# Patient Record
Sex: FEMALE | Race: WHITE | NOT HISPANIC OR LATINO | Employment: FULL TIME
[De-identification: names, ages, dates, MRNs, and addresses within clinical notes are randomized per-mention and may not be internally consistent; named-entity substitution may affect disease eponyms.]

---

## 2017-05-26 ENCOUNTER — HEALTH MAINTENANCE LETTER (OUTPATIENT)
Age: 42
End: 2017-05-26

## 2018-03-26 ENCOUNTER — OFFICE VISIT (OUTPATIENT)
Dept: URGENT CARE | Facility: RETAIL CLINIC | Age: 43
End: 2018-03-26
Payer: COMMERCIAL

## 2018-03-26 VITALS
SYSTOLIC BLOOD PRESSURE: 118 MMHG | TEMPERATURE: 97.8 F | OXYGEN SATURATION: 99 % | DIASTOLIC BLOOD PRESSURE: 69 MMHG | HEART RATE: 80 BPM

## 2018-03-26 DIAGNOSIS — J02.0 STREPTOCOCCAL PHARYNGITIS: ICD-10-CM

## 2018-03-26 DIAGNOSIS — J02.9 ACUTE PHARYNGITIS, UNSPECIFIED ETIOLOGY: Primary | ICD-10-CM

## 2018-03-26 LAB — S PYO AG THROAT QL IA.RAPID: ABNORMAL

## 2018-03-26 PROCEDURE — 99213 OFFICE O/P EST LOW 20 MIN: CPT | Performed by: FAMILY MEDICINE

## 2018-03-26 PROCEDURE — 87880 STREP A ASSAY W/OPTIC: CPT | Mod: QW | Performed by: FAMILY MEDICINE

## 2018-03-26 RX ORDER — CLINDAMYCIN HCL 150 MG
150 CAPSULE ORAL 3 TIMES DAILY
Qty: 30 CAPSULE | Refills: 0 | Status: SHIPPED | OUTPATIENT
Start: 2018-03-26 | End: 2019-03-10

## 2018-03-26 NOTE — NURSING NOTE
"Chief Complaint   Patient presents with     Pharyngitis     x 3 days       Initial /69  Pulse 80  Temp 97.8  F (36.6  C) (Oral)  SpO2 99% Estimated body mass index is 25.9 kg/(m^2) as calculated from the following:    Height as of 5/31/15: 5' 0.98\" (1.549 m).    Weight as of 3/30/16: 137 lb (62.1 kg).  Medication Reconciliation: complete   Dana Nguyen      "

## 2018-03-26 NOTE — PROGRESS NOTES
SUBJECTIVE:  Mecca Engle is a 43 year old female with a chief complaint of sore throat.  Onset of symptoms was 2 day(s) ago.    Course of illness: still present and worsening.  Severity moderate  Current and Associated symptoms: fatigue  Treatment measures tried include Tylenol/Ibuprofen.  Predisposing factors include exposure to strep.    Past Medical History:   Diagnosis Date     Abnormal Papanicolaou smear of cervix and cervical HPV 1993    s/p cryotherapy     Supervision of high-risk pregnancy with grand multiparity      Current Outpatient Prescriptions   Medication Sig Dispense Refill     clindamycin (CLEOCIN) 150 MG capsule Take 1 capsule (150 mg) by mouth 3 times daily 30 capsule 0     ibuprofen (ADVIL,MOTRIN) 800 MG tablet Take 1 tablet (800 mg) by mouth every 8 hours as needed for moderate pain 60 tablet 1     History   Smoking Status     Never Smoker   Smokeless Tobacco     Never Used       ROS:  Review of systems negative except as stated above.    OBJECTIVE:   /69  Pulse 80  Temp 97.8  F (36.6  C) (Oral)  SpO2 99%  GENERAL APPEARANCE: healthy, alert and no distress  EYES: EOMI,  PERRL, conjunctiva clear  HENT: pharynx red  NECK: bilateral anterior cervical adenopathy  RESP: lungs clear to auscultation - no rales, rhonchi or wheezes  CV: regular rates and rhythm, normal S1 S2, no murmur noted  ABDOMEN:  soft, nontender, no HSM or masses and bowel sounds normal  SKIN: no suspicious lesions or rashes    Rapid Strep test is positive    ASSESSMENT:clindamycin     Acute pharyngitis, unspecified etiology  Streptococcal pharyngitis    PLAN:   Symptomatic treat with gargles, lozenges, and OTC analgesic as needed.   Follow-up with primary care provider if not improving.

## 2018-03-26 NOTE — PATIENT INSTRUCTIONS
Pharyngitis: Strep (Confirmed)    You have had a positive test for strep throat. Strep throat is a contagious illness. It is spread by coughing, kissing or by touching others after touching your mouth or nose. Symptoms include throat pain that is worse with swallowing, aching all over, headache, and fever. It is treated with antibiotic medicine. This should help you start to feel better in 1 to 2 days.  Home care    Rest at home. Drink plenty of fluids to you won't get dehydrated.    No work or school for the first 2 days of taking the antibiotics. After this time, you will not be contagious. You can then return to school or work if you are feeling better.     Take antibiotic medicine for the full 10 days, even if you feel better. This is very important to ensure the infection is treated. It is also important to prevent medicine-resistant germs from developing. If you were given an antibiotic shot, you don't need any more antibiotics.    You may use acetaminophen or ibuprofen to control pain or fever, unless another medicine was prescribed for this. Talk with your doctor before taking these medicines if you have chronic liver or kidney disease. Also talk with your doctor if you have had a stomach ulcer or GI bleeding.    Throat lozenges or sprays help reduce pain. Gargling with warm saltwater will also reduce throat pain. Dissolve 1/2 teaspoon of salt in 1 glass of warm water. This may be useful just before meals.     Soft foods are OK. Avoid salty or spicy foods.  Follow-up care  Follow up with your healthcare provider or our staff if you don't get better over the next week.  When to seek medical advice  Call your healthcare provider right away if any of these occur:    Fever of 100.4 F (38 C) or higher, or as directed by your healthcare provider    New or worsening ear pain, sinus pain, or headache    Painful lumps in the back of neck    Stiff neck    Lymph nodes getting larger or becoming soft in the  middle    You can't swallow liquids or you can't open your mouth wide because of throat pain    Signs of dehydration. These include very dark urine or no urine, sunken eyes, and dizziness.    Trouble breathing or noisy breathing    Muffled voice    Rash  Date Last Reviewed: 4/13/2015 2000-2017 [employer]. [address] [place] [address], [place] [address]. All rights reserved. This information is not intended as a substitute for professional medical care. Always follow your healthcare professional's instructions.

## 2018-03-26 NOTE — MR AVS SNAPSHOT
After Visit Summary   3/26/2018    Mecca Engle    MRN: [id number]           Patient Information     Date Of Birth          1975        Visit Information        Provider Department      3/26/2018 10:00 AM Bonifacio Glasgow MD [facility]        Today's Diagnoses     Acute pharyngitis, unspecified etiology    -  1    Streptococcal pharyngitis          Care Instructions      Pharyngitis: Strep (Confirmed)    You have had a positive test for strep throat. Strep throat is a contagious illness. It is spread by coughing, kissing or by touching others after touching your mouth or nose. Symptoms include throat pain that is worse with swallowing, aching all over, headache, and fever. It is treated with antibiotic medicine. This should help you start to feel better in 1 to 2 days.  Home care    Rest at home. Drink plenty of fluids to you won't get dehydrated.    No work or school for the first 2 days of taking the antibiotics. After this time, you will not be contagious. You can then return to school or work if you are feeling better.     Take antibiotic medicine for the full 10 days, even if you feel better. This is very important to ensure the infection is treated. It is also important to prevent medicine-resistant germs from developing. If you were given an antibiotic shot, you don't need any more antibiotics.    You may use acetaminophen or ibuprofen to control pain or fever, unless another medicine was prescribed for this. Talk with your doctor before taking these medicines if you have chronic liver or kidney disease. Also talk with your doctor if you have had a stomach ulcer or GI bleeding.    Throat lozenges or sprays help reduce pain. Gargling with warm saltwater will also reduce throat pain. Dissolve 1/2 teaspoon of salt in 1 glass of warm water. This may be useful just before meals.     Soft foods are OK. Avoid salty or spicy foods.  Follow-up care  Follow up with your  "healthcare provider or our staff if you don't get better over the next week.  When to seek medical advice  Call your healthcare provider right away if any of these occur:    Fever of 100.4 F (38 C) or higher, or as directed by your healthcare provider    New or worsening ear pain, sinus pain, or headache    Painful lumps in the back of neck    Stiff neck    Lymph nodes getting larger or becoming soft in the middle    You can't swallow liquids or you can't open your mouth wide because of throat pain    Signs of dehydration. These include very dark urine or no urine, sunken eyes, and dizziness.    Trouble breathing or noisy breathing    Muffled voice    Rash  Date Last Reviewed: 2015-2017 [employer]. [address] [place] [address], [place] [address]. All rights reserved. This information is not intended as a substitute for professional medical care. Always follow your healthcare professional's instructions.                Follow-ups after your visit        Who to contact     You can reach your care team any time of the day by calling [phone].  Notification of test results:  If you have an abnormal lab result, we will notify you by phone as soon as possible.         Additional Information About Your Visit        [employer] Information     [employer] lets you send messages to your doctor, view your test results, renew your prescriptions, schedule appointments and more. To sign up, go to [email or web address] . Click on \"Log in\" on the left side of the screen, which will take you to the Welcome page. Then click on \"Sign up Now\" on the right side of the page.     You will be asked to enter the access code listed below, as well as some personal information. Please follow the directions to create your username and password.     Your access code is: [email or web address]  Expires: 2018 10:13 AM     Your access code will  in 90 days. If you need help or a new code, please call your [facility] clinic or " [phone].        Care EveryWhere ID     This is your Care EveryWhere ID. This could be used by other organizations to access your [place] medical records  [id number]        Your Vitals Were     Pulse Temperature Pulse Oximetry             80 97.8  F (36.6  C) (Oral) 99%          Blood Pressure from Last 3 Encounters:   03/26/18 118/69   03/30/16 108/74   05/31/15 (!) 115/96    Weight from Last 3 Encounters:   03/30/16 137 lb (62.1 kg)   09/01/09 156 lb (70.8 kg)   08/18/09 157 lb (71.2 kg)              We Performed the Following     BETA STREP GROUP A R/O CULTURE     RAPID STREP SCREEN          Today's Medication Changes          These changes are accurate as of 3/26/18 10:14 AM.  If you have any questions, ask your nurse or doctor.               Start taking these medicines.        Dose/Directions    clindamycin 150 MG capsule   Commonly known as:  CLEOCIN   Used for:  Streptococcal pharyngitis   Started by:  Bonifacio Glasgow MD        Dose:  150 mg   Take 1 capsule (150 mg) by mouth 3 times daily   Quantity:  30 capsule   Refills:  0            Where to get your medicines      These medications were sent to [address]  [address], [facility] [address]     Phone:  [phone]     clindamycin 150 MG capsule                Primary Care Provider Office Phone # Fax #    Cooper MADDISON Moraes [phone]        [facility]   [facility] [id number]        Equal Access to Services     [facility]: Hadii jas ku hadasho Soomaali, waaxda luqadaha, qaybta kaalmada adeegyada, montrell morocho. So [facility] [phone].    ATENCIÓN: Si habla español, tiene a boss disposición servicios gratuitos de asistencia lingüística. Llame al [phone].    We comply with applicable federal civil rights laws and [place] laws. We do not discriminate on the basis of race, color, national origin, age, disability, sex, sexual orientation, or gender  identity.            Thank you!     Thank you for choosing [facility]  for your care. Our goal is always to provide you with excellent care. Hearing back from our patients is one way we can continue to improve our services. Please take a few minutes to complete the written survey that you may receive in the mail after your visit with us. Thank you!             Your Updated Medication List - Protect others around you: Learn how to safely use, store and throw away your medicines at www.disposemymeds.org.          This list is accurate as of 3/26/18 10:14 AM.  Always use your most recent med list.                   Brand Name Dispense Instructions for use Diagnosis    clindamycin 150 MG capsule    CLEOCIN    30 capsule    Take 1 capsule (150 mg) by mouth 3 times daily    Streptococcal pharyngitis       ibuprofen 800 MG tablet    ADVIL/MOTRIN    60 tablet    Take 1 tablet (800 mg) by mouth every 8 hours as needed for moderate pain    S/P D&C (status post dilation and curettage), Miscarriage

## 2019-03-10 ENCOUNTER — OFFICE VISIT (OUTPATIENT)
Dept: URGENT CARE | Facility: RETAIL CLINIC | Age: 44
End: 2019-03-10
Payer: COMMERCIAL

## 2019-03-10 VITALS
DIASTOLIC BLOOD PRESSURE: 76 MMHG | SYSTOLIC BLOOD PRESSURE: 116 MMHG | HEART RATE: 77 BPM | TEMPERATURE: 98.5 F | OXYGEN SATURATION: 100 %

## 2019-03-10 DIAGNOSIS — J02.9 ACUTE PHARYNGITIS, UNSPECIFIED ETIOLOGY: Primary | ICD-10-CM

## 2019-03-10 DIAGNOSIS — J02.0 STREPTOCOCCAL PHARYNGITIS: ICD-10-CM

## 2019-03-10 LAB — S PYO AG THROAT QL IA.RAPID: NORMAL

## 2019-03-10 PROCEDURE — 87081 CULTURE SCREEN ONLY: CPT | Performed by: FAMILY MEDICINE

## 2019-03-10 PROCEDURE — 87880 STREP A ASSAY W/OPTIC: CPT | Mod: QW | Performed by: FAMILY MEDICINE

## 2019-03-10 PROCEDURE — 99213 OFFICE O/P EST LOW 20 MIN: CPT | Performed by: FAMILY MEDICINE

## 2019-03-10 RX ORDER — CEPHALEXIN 500 MG/1
500 CAPSULE ORAL 2 TIMES DAILY
Qty: 20 CAPSULE | Refills: 0 | Status: SHIPPED | OUTPATIENT
Start: 2019-03-10 | End: 2019-03-19

## 2019-03-10 NOTE — PATIENT INSTRUCTIONS
Patient Education     Pharyngitis: Strep (Confirmed)    You have had a positive test for strep throat. Strep throat is a contagious illness. It is spread by coughing, kissing or by touching others after touching your mouth or nose. Symptoms include throat pain that is worse with swallowing, aching all over, headache, and fever. It is treated with antibiotic medicine. This should help you start to feel better in 1 to 2 days.  Home care    Rest at home. Drink plenty of fluids to you won't get dehydrated.    No work or school for the first 2 days of taking the antibiotics. After this time, you will not be contagious. You can then return to school or work if you are feeling better.     Take antibiotic medicine for the full 10 days, even if you feel better. This is very important to ensure the infection is treated. It is also important to prevent medicine-resistant germs from developing. If you were given an antibiotic shot, you don't need any more antibiotics.    You may use acetaminophen or ibuprofen to control pain or fever, unless another medicine was prescribed for this. Talk with your healthcare provider before taking these medicines if you have chronic liver or kidney disease. Also talk with your healthcare provider if you have had a stomach ulcer or GI bleeding.    Throat lozenges or sprays help reduce pain. Gargling with warm saltwater will also reduce throat pain. Dissolve 1/2 teaspoon of salt in 1 glass of warm water. This may be useful just before meals.     Soft foods are OK. Don't eat salty or spicy foods.  Follow-up care  Follow up with your healthcare provider or our staff if you don't get better over the next week.  When to seek medical advice  Call your healthcare provider right away if any of these occur:    Fever of 100.4 F (38 C) or higher, or as directed by your healthcare provider    New or worsening ear pain, sinus pain, or headache    Painful lumps in the back of neck    Stiff neck    Lymph  nodes getting larger or becoming soft in the middle    You can't swallow liquids or you can't open your mouth wide because of throat pain    Signs of dehydration. These include very dark urine or no urine, sunken eyes, and dizziness.    Trouble breathing or noisy breathing    Muffled voice    Rash  Prevention  Here are steps you can take to help prevent an infection:    Keep good hand washing habits.    Don t have close contact with people who have sore throats, colds, or other upper respiratory infections.    Don t smoke, and stay away from secondhand smoke.  Date Last Reviewed: 11/1/2017 2000-2018 [employer]. [address] [place] [address], [place] [address]. All rights reserved. This information is not intended as a substitute for professional medical care. Always follow your healthcare professional's instructions.

## 2019-03-10 NOTE — PROGRESS NOTES
SUBJECTIVE:  Mecca Engle is a 43 year old female with a chief complaint of sore throat.  Onset of symptoms was 8 hour(s) ago.    Course of illness: still present and worsening.  Severity moderate  Current and Associated symptoms: fever, chills, body aches and fatigue  Treatment measures tried include Tylenol/Ibuprofen.  Predisposing factors include exposure to strep, three of her children have strep.    Past Medical History:   Diagnosis Date     Abnormal Papanicolaou smear of cervix and cervical HPV 1993    s/p cryotherapy     Supervision of high-risk pregnancy with grand multiparity      Current Outpatient Medications   Medication Sig Dispense Refill     cephALEXin (KEFLEX) 500 MG capsule Take 1 capsule (500 mg) by mouth 2 times daily for 10 days 20 capsule 0     ibuprofen (ADVIL,MOTRIN) 800 MG tablet Take 1 tablet (800 mg) by mouth every 8 hours as needed for moderate pain (Patient not taking: Reported on 3/10/2019) 60 tablet 1     History   Smoking Status     Never Smoker   Smokeless Tobacco     Never Used       ROS:  Review of systems negative except as stated above.    OBJECTIVE:   /76   Pulse 77   Temp 98.5  F (36.9  C) (Oral)   SpO2 100%   GENERAL APPEARANCE: mild distress and cooperative  EYES: EOMI,  PERRL, conjunctiva clear  HENT: tonsillar erythema  NECK: supple, non-tender to palpation, no adenopathy noted  RESP: lungs clear to auscultation - no rales, rhonchi or wheezes  CV: regular rates and rhythm, normal S1 S2, no murmur noted  ABDOMEN:  soft, nontender, no HSM or masses and bowel sounds normal  SKIN: no suspicious lesions or rashes    Rapid Strep test is negative; await throat culture results.    ASSESSMENT:     Streptococcal pharyngitis  Acute pharyngitis, unspecified etiology    PLAN:  Will treat despite negative rapid strep today.  Three of her children have pos strep tests.  Symptomatic treat with gargles, lozenges, and OTC analgesic as needed.   Follow-up with primary care provider  if not improving.

## 2019-03-12 LAB
BACTERIA SPEC CULT: NORMAL
SPECIMEN SOURCE: NORMAL

## 2019-03-19 ENCOUNTER — OFFICE VISIT (OUTPATIENT)
Dept: FAMILY MEDICINE | Facility: OTHER | Age: 44
End: 2019-03-19
Payer: COMMERCIAL

## 2019-03-19 VITALS
SYSTOLIC BLOOD PRESSURE: 112 MMHG | BODY MASS INDEX: 26.35 KG/M2 | RESPIRATION RATE: 18 BRPM | HEIGHT: 60 IN | WEIGHT: 134.2 LBS | DIASTOLIC BLOOD PRESSURE: 74 MMHG | HEART RATE: 70 BPM | TEMPERATURE: 98 F

## 2019-03-19 DIAGNOSIS — F41.8 SITUATIONAL ANXIETY: ICD-10-CM

## 2019-03-19 DIAGNOSIS — F51.04 PSYCHOPHYSIOLOGICAL INSOMNIA: Primary | ICD-10-CM

## 2019-03-19 PROCEDURE — 99214 OFFICE O/P EST MOD 30 MIN: CPT | Performed by: STUDENT IN AN ORGANIZED HEALTH CARE EDUCATION/TRAINING PROGRAM

## 2019-03-19 ASSESSMENT — ANXIETY QUESTIONNAIRES
GAD7 TOTAL SCORE: 9
7. FEELING AFRAID AS IF SOMETHING AWFUL MIGHT HAPPEN: NOT AT ALL
3. WORRYING TOO MUCH ABOUT DIFFERENT THINGS: MORE THAN HALF THE DAYS
GAD7 TOTAL SCORE: 9
2. NOT BEING ABLE TO STOP OR CONTROL WORRYING: MORE THAN HALF THE DAYS
5. BEING SO RESTLESS THAT IT IS HARD TO SIT STILL: NOT AT ALL
GAD7 TOTAL SCORE: 9
1. FEELING NERVOUS, ANXIOUS, OR ON EDGE: NEARLY EVERY DAY
7. FEELING AFRAID AS IF SOMETHING AWFUL MIGHT HAPPEN: NOT AT ALL
6. BECOMING EASILY ANNOYED OR IRRITABLE: SEVERAL DAYS
4. TROUBLE RELAXING: SEVERAL DAYS

## 2019-03-19 ASSESSMENT — PATIENT HEALTH QUESTIONNAIRE - PHQ9
SUM OF ALL RESPONSES TO PHQ QUESTIONS 1-9: 8
SUM OF ALL RESPONSES TO PHQ QUESTIONS 1-9: 8
10. IF YOU CHECKED OFF ANY PROBLEMS, HOW DIFFICULT HAVE THESE PROBLEMS MADE IT FOR YOU TO DO YOUR WORK, TAKE CARE OF THINGS AT HOME, OR GET ALONG WITH OTHER PEOPLE: SOMEWHAT DIFFICULT

## 2019-03-19 ASSESSMENT — MIFFLIN-ST. JEOR: SCORE: 1181.48

## 2019-03-19 NOTE — PATIENT INSTRUCTIONS
Melatonin Gummies 5-10 mg at bedtime. Take 30-45 minutes before sleep.    If melatonin does not work, you can try the prescription for amitriptyline 1/2 to 1 tablet (25-50 mg) thirty minutes before bedtime. I would like you to try this on a night when you do not work the next day.    Call me if you have any troubles with amitriptyline and we can try something different.    Julia Foss, NP-C

## 2019-03-19 NOTE — PROGRESS NOTES
SUBJECTIVE:   Mecca Engle is a 43 year old female who presents to clinic today for the following health issues:      HPI  Abnormal Mood Symptoms  Onset: 1 month    Description:   Depression: no  Anxiety: YES    Accompanying Signs & Symptoms:  Still participating in activities that you used to enjoy: YES  Fatigue: YES  Irritability: YES  Difficulty concentrating: YES  Changes in appetite: YES- less  Problems with sleep: YES  Heart racing/beating fast : YES  Thoughts of hurting yourself or others: none    History:   Recent stress: YES  Prior depression hospitalization: None  Family history of depression: no  Family history of anxiety: no    Precipitating factors:   Alcohol/drug use: no    Alleviating factors:  Took a class on breathing, it helps sometimes    Therapies Tried and outcome: None    Her symptoms started after she started a new job as a .  She is currently in training which last 3 months.  She has been in training for 5 weeks.  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  She is struggling with anxiety from the intensity of the training and the constructive criticism that her supervisor is giving her.  Her supervisor is quite montes as they needs to be well trained for their role as any small mistakes can result in potential danger to herself as the inmates are sometimes physically aggressive.  Although she understands this she sometimes takes the criticism personally and worries about making future mistakes.  She is most concerned that she will fail at the training and that she will not be able to do her job well.  This has resulted in increased anxiety and difficulty with sleeping at night.  Because of her poor sleep she is having difficulty focusing.  She called in today to work as she did not sleep much at all last night and felt that she could not be awake enough to focus for training.  She is wondering what she can do to help with her anxiety and  sleep.    Answers for HPI/ROS submitted by the patient on 3/19/2019   If you checked off any problems, how difficult have these problems made it for you to do your work, take care of things at home, or get along with other people?: Somewhat difficult  PHQ9 TOTAL SCORE: 8  CHIDI 7 TOTAL SCORE: 9    Problem list and histories reviewed & adjusted, as indicated.  Additional history: as documented    Patient Active Problem List   Diagnosis     Headache     Umbilical hernia     Grand multiparity with  problem     High-risk pregnancy     Open wound of nasal septum     Hydronephrosis     Elderly multigravida with antepartum condition or complication     CARDIOVASCULAR SCREENING; LDL GOAL LESS THAN 160     Past Surgical History:   Procedure Laterality Date     DILATION AND CURETTAGE SUCTION N/A 2015    Procedure: DILATION AND CURETTAGE SUCTION;  Surgeon: Cooper Moraes MD;  Location: [facility] OR     HC COLP CERVIX/UPPER VAGINA  99     HC DILATION/CURETTAGE DIAG/THER NON OB      D & C     HC REMOVAL OF TONSILS,<13 Y/O      Tonsils <11y.o.       Social History     Tobacco Use     Smoking status: Never Smoker     Smokeless tobacco: Never Used   Substance Use Topics     Alcohol use: No     Family History   Problem Relation Age of Onset     Circulatory Mother         anemia     Arthritis Mother      Gastrointestinal Disease Mother         crohns disease at age 53     Cancer Paternal Grandfather         lymph     Hypertension Maternal Aunt      Hypertension Maternal Grandfather      Cerebrovascular Disease Maternal Grandfather          age 54     Thyroid Disease Maternal Aunt      Obesity Maternal Aunt      Diabetes Maternal Aunt      Cancer - colorectal Maternal Uncle         diagnosed at age 58-living     Respiratory Other         pt son has asthma         Current Outpatient Medications   Medication Sig Dispense Refill     amitriptyline (ELAVIL) 25 MG tablet Take 0.5-1 tablets (12.5-25 mg) by mouth nightly as  "needed for sleep 30 tablet 0     ibuprofen (ADVIL,MOTRIN) 800 MG tablet Take 1 tablet (800 mg) by mouth every 8 hours as needed for moderate pain (Patient not taking: Reported on 3/10/2019) 60 tablet 1     Allergies   Allergen Reactions     Penicillins      BP Readings from Last 3 Encounters:   03/19/19 112/74   03/10/19 116/76   03/26/18 118/69    Wt Readings from Last 3 Encounters:   03/19/19 60.9 kg (134 lb 3.2 oz)   03/30/16 62.1 kg (137 lb)   09/01/09 70.8 kg (156 lb)                  Labs reviewed in EPIC    ROS:  Constitutional, HEENT, cardiovascular, pulmonary, gi and gu systems are negative, except as otherwise noted.    OBJECTIVE:     /74   Pulse 70   Temp 98  F (36.7  C) (Temporal)   Resp 18   Ht 1.526 m (5' 0.08\")   Wt 60.9 kg (134 lb 3.2 oz)   BMI 26.14 kg/m    Body mass index is 26.14 kg/m .  GENERAL: alert, no acute distress and fatigued  RESP: lungs clear to auscultation - no rales, rhonchi or wheezes  CV: regular rate and rhythm, normal S1 S2, no S3 or S4, no murmur, click or rub  PSYCH: mentation appears normal, anxious, fatigued, judgement and insight intact and appearance well groomed    Diagnostic Test Results:  none     ASSESSMENT/PLAN:     1. Psychophysiological insomnia  Patient's insomnia and anxiety are completely situational with her new job.  I have recommended she work on improving her sleep as this will likely improve her anxiety.  I recommended she start by trying melatonin as this is the safest and least likely to cause side effects.  If she does not noticed an improvement in her sleep with melatonin she may do a trial of amitriptyline.  I discussed the potential side effects of amitriptyline with her and recommend that she start at the lowest dose of 1/2 tablet.  Also recommended that she try amitriptyline for the first time when she is not working the next day as she may experience some morning grogginess.  Hopefully her anxiety will improve with improved sleep.  She " will follow-up if her symptoms persist or worsen.  - amitriptyline (ELAVIL) 25 MG tablet; Take 0.5-1 tablets (12.5-25 mg) by mouth nightly as needed for sleep  Dispense: 30 tablet; Refill: 0  - melatonin 5 MG CAPS; Take 5-10 mg by mouth At Bedtime    2. Situational anxiety  For her anxiety also recommended that she work on mindfulness and understanding that the criticism is constructive and is intended to protect her from potential harm in the environment that she is working in.  I also recommended that she talk to coworkers who have been through training and can possibly offer her some recommendations of how to handle the stresses of initial training.    Greater than 50% of 25 minute visit were spent on counseling or coordination of care regarding insomnia, anxiety     STANTON Garcia [facility]  Answers for HPI/ROS submitted by the patient on 3/19/2019   If you checked off any problems, how difficult have these problems made it for you to do your work, take care of things at home, or get along with other people?: Somewhat difficult  PHQ9 TOTAL SCORE: 8  CHIDI 7 TOTAL SCORE: 9

## 2019-03-20 PROBLEM — F41.8 SITUATIONAL ANXIETY: Status: ACTIVE | Noted: 2019-03-20

## 2019-03-20 PROBLEM — F51.04 PSYCHOPHYSIOLOGICAL INSOMNIA: Status: ACTIVE | Noted: 2019-03-20

## 2019-03-20 ASSESSMENT — PATIENT HEALTH QUESTIONNAIRE - PHQ9: SUM OF ALL RESPONSES TO PHQ QUESTIONS 1-9: 8

## 2019-03-20 ASSESSMENT — ANXIETY QUESTIONNAIRES: GAD7 TOTAL SCORE: 9

## 2019-05-15 ENCOUNTER — TELEPHONE (OUTPATIENT)
Dept: FAMILY MEDICINE | Facility: OTHER | Age: 44
End: 2019-05-15

## 2019-05-15 NOTE — TELEPHONE ENCOUNTER
Summary:    Patient is due/failing the following:   LDL, PAP and PHYSICAL    Action needed:   Patient needs office visit for Physical and PAP. and Patient needs fasting lab only appointment    Type of outreach:    Phone, spoke to patient.  patient will call back to schedule     Questions for provider review:    None                                                                                                                                    Kimberley Mcfarland       Chart routed to Care Team .  Panel Management Review      Patient has the following on her problem list: None      Composite cancer screening  Chart review shows that this patient is due/due soon for the following Pap Smear

## 2019-09-24 ENCOUNTER — OFFICE VISIT (OUTPATIENT)
Dept: URGENT CARE | Facility: RETAIL CLINIC | Age: 44
End: 2019-09-24
Payer: COMMERCIAL

## 2019-09-24 VITALS
DIASTOLIC BLOOD PRESSURE: 84 MMHG | TEMPERATURE: 98.9 F | OXYGEN SATURATION: 100 % | HEART RATE: 81 BPM | SYSTOLIC BLOOD PRESSURE: 120 MMHG

## 2019-09-24 DIAGNOSIS — J02.9 ACUTE PHARYNGITIS, UNSPECIFIED ETIOLOGY: Primary | ICD-10-CM

## 2019-09-24 LAB — S PYO AG THROAT QL IA.RAPID: NORMAL

## 2019-09-24 PROCEDURE — 87081 CULTURE SCREEN ONLY: CPT | Performed by: NURSE PRACTITIONER

## 2019-09-24 PROCEDURE — 87880 STREP A ASSAY W/OPTIC: CPT | Mod: QW | Performed by: NURSE PRACTITIONER

## 2019-09-24 PROCEDURE — 99213 OFFICE O/P EST LOW 20 MIN: CPT | Performed by: NURSE PRACTITIONER

## 2019-09-24 ASSESSMENT — ENCOUNTER SYMPTOMS
CHILLS: 0
ADENOPATHY: 1
NAUSEA: 0
MYALGIAS: 0
SORE THROAT: 1
HEADACHES: 0
FEVER: 0
FATIGUE: 1
DIAPHORESIS: 0
COUGH: 0
SLEEP DISTURBANCE: 0
VOICE CHANGE: 1

## 2019-09-24 NOTE — PROGRESS NOTES
Chief Complaint   Patient presents with     Pharyngitis     x 2 days      Fatigue     SUBJECTIVE:  Mecca Engle is a 44 year old female presenting with a chief complaint of a sore throat and fatigue for 2 days.  Course of illness: gradual onset and still present. Scheduled to get vaccines tomorrow and wants to be sure that is okay.  Severity: mild  Treatment measures tried include: None tried.  Predisposing factors include: gets strep throat almost every year.    Past Medical History:   Diagnosis Date     Abnormal Papanicolaou smear of cervix and cervical HPV 1993    s/p cryotherapy     Supervision of high-risk pregnancy with grand multiparity      amitriptyline (ELAVIL) 25 MG tablet, Take 0.5-1 tablets (12.5-25 mg) by mouth nightly as needed for sleep (Patient not taking: Reported on 9/24/2019)  ibuprofen (ADVIL,MOTRIN) 800 MG tablet, Take 1 tablet (800 mg) by mouth every 8 hours as needed for moderate pain (Patient not taking: Reported on 3/10/2019)  melatonin 5 MG CAPS, Take 5-10 mg by mouth At Bedtime (Patient not taking: Reported on 9/24/2019)    No current facility-administered medications on file prior to visit.     Social History     Tobacco Use     Smoking status: Never Smoker     Smokeless tobacco: Never Used   Substance Use Topics     Alcohol use: No     Allergies   Allergen Reactions     Penicillins      Review of Systems   Constitutional: Positive for fatigue. Negative for chills, diaphoresis and fever.   HENT: Positive for sore throat and voice change. Negative for congestion and ear pain.    Respiratory: Negative for cough.    Gastrointestinal: Negative for nausea.   Musculoskeletal: Negative for myalgias.   Skin: Negative for rash.   Neurological: Negative for headaches.   Hematological: Positive for adenopathy.   Psychiatric/Behavioral: Negative for sleep disturbance.     OBJECTIVE:   /84   Pulse 81   Temp 98.9  F (37.2  C) (Oral)   SpO2 100%      Physical Exam  Vitals signs reviewed.    Constitutional:       General: She is not in acute distress.     Appearance: She is well-developed. She is not diaphoretic.   HENT:      Head: Normocephalic and atraumatic.      Nose: Nose normal.      Mouth/Throat:      Mouth: Mucous membranes are moist.      Pharynx: Posterior oropharyngeal erythema (mild) present. No oropharyngeal exudate (thin white film on tongue).   Eyes:      Conjunctiva/sclera: Conjunctivae normal.      Pupils: Pupils are equal, round, and reactive to light.   Neck:      Musculoskeletal: Normal range of motion and neck supple.   Cardiovascular:      Rate and Rhythm: Normal rate.   Pulmonary:      Effort: Pulmonary effort is normal. No respiratory distress.   Musculoskeletal: Normal range of motion.   Lymphadenopathy:      Cervical: Cervical adenopathy present.   Skin:     General: Skin is warm.      Capillary Refill: Capillary refill takes less than 2 seconds.      Findings: No rash.   Neurological:      Mental Status: She is alert and oriented to person, place, and time.   Psychiatric:         Mood and Affect: Mood normal.         Behavior: Behavior normal.       Rapid Strep test is negative; await throat culture results.    ASSESSMENT:    ICD-10-CM    1. Acute pharyngitis, unspecified etiology J02.9 BETA STREP GROUP A R/O CULTURE     RAPID STREP SCREEN     PLAN:   Patient Instructions   Rapid strep test today is negative.   Your throat culture is pending. [facility] will call if positive results to start antibiotics at that time; No call if the culture is negative.  Vaccines tomorrow at work - appropriate to receive during mild illness, not mounting a significant fever.  Drink plenty of fluids and rest.  May use salt water gargles- about 8 oz warm water with about 1 teaspoon salt  Sucrets and Cepacol spray are over the counter medications that numb the throat.  Over the counter pain relievers such as tylenol or ibuprofen may be used as needed.  Honey has been shown to be helpful in  cough management and is soothing to a sore throat. May add to lemon tea.  Please follow up with primary care provider if not improving, worsening or new symptoms.    Follow up with primary care provider with any problems, questions or concerns or if symptoms worsen or fail to improve. Patient agreed to plan and verbalized understanding.    GABBIE Nguyen-BC  [facility] [place]

## 2019-09-24 NOTE — PATIENT INSTRUCTIONS
Rapid strep test today is negative.   Your throat culture is pending. [facility] will call if positive results to start antibiotics at that time; No call if the culture is negative.  Vaccines tomorrow at work - appropriate to receive during mild illness, not mounting a significant fever.  Drink plenty of fluids and rest.  May use salt water gargles- about 8 oz warm water with about 1 teaspoon salt  Sucrets and Cepacol spray are over the counter medications that numb the throat.  Over the counter pain relievers such as tylenol or ibuprofen may be used as needed.  Honey has been shown to be helpful in cough management and is soothing to a sore throat. May add to lemon tea.  Please follow up with primary care provider if not improving, worsening or new symptoms.

## 2019-09-26 LAB
BACTERIA SPEC CULT: NORMAL
SPECIMEN SOURCE: NORMAL

## 2019-11-21 NOTE — PROGRESS NOTES
Subjective     Mecca Engle is a 44 year old female who presents to clinic today for the following health issues:    HPI   Concern - lightheadedness    Onset: Wednesday and Thursday    Description:   Lightheaded on Wednesday and Thursday lasted about a hour. Had blood pressure was checked and is was good. No dizziness or heart racing today    Intensity:     Progression of Symptoms:  Improving no symptoms today    Accompanying Signs & Symptoms:  Nausea, fatigue and felt like heart was racing    Previous history of similar problem:   None    Patient presents today for evaluation of lightheadedness. She reports symptoms started on Wednesday. Start before going into work. She reports she sat in her car for a while debating on going into work or into the ER. She was able to work her entire shift. She reports she just felt off - dizzy, heart racing, nauseated. This lasted most of the day. Had her blood pressure checked and this was normal. She reports symptoms occurred again on Thursday but only lasted about an house and then resolved. Today she has been feeling well. She reports she has never felt like this before. She denies any headaches, chest pain, vision changes or shortness of breath. No paresthesias. No urinary symptoms. No recent cold symptoms or fevers. She reports the only stressor she can identify is that her  is gone hunting for 9 days - thought he was coming home sooner. They have 13 children so life is very busy. She reports that is nothing new. She does report she feels like she is struggling with situational anxiety a bit more. Wonders if that triggered symptoms. Sometimes hard to sleep at night. This has been ongoing for quite some time.     Patient Active Problem List   Diagnosis     Headache     Umbilical hernia     Grand multiparity with  problem     High-risk pregnancy     Open wound of nasal septum     Hydronephrosis     Elderly multigravida with antepartum condition or  complication     CARDIOVASCULAR SCREENING; LDL GOAL LESS THAN 160     Psychophysiological insomnia     Situational anxiety     Past Surgical History:   Procedure Laterality Date     DILATION AND CURETTAGE SUCTION N/A 2015    Procedure: DILATION AND CURETTAGE SUCTION;  Surgeon: Cooper Moraes MD;  Location: [facility] OR     HC COLP CERVIX/UPPER VAGINA  99     HC DILATION/CURETTAGE DIAG/THER NON OB      D & C     HC REMOVAL OF TONSILS,<11 Y/O      Tonsils <11y.o.       Social History     Tobacco Use     Smoking status: Never Smoker     Smokeless tobacco: Never Used   Substance Use Topics     Alcohol use: No     Family History   Problem Relation Age of Onset     Circulatory Mother         anemia     Arthritis Mother      Gastrointestinal Disease Mother         crohns disease at age 53     Cancer Paternal Grandfather         lymph     Hypertension Maternal Aunt      Hypertension Maternal Grandfather      Cerebrovascular Disease Maternal Grandfather          age 54     Thyroid Disease Maternal Aunt      Obesity Maternal Aunt      Diabetes Maternal Aunt      Cancer - colorectal Maternal Uncle         diagnosed at age 58-living     Respiratory Other         pt son has asthma         Current Outpatient Medications   Medication Sig Dispense Refill     hydrOXYzine (ATARAX) 10 MG tablet Take 1-2 tablets (10-20 mg) by mouth every 4 hours as needed for anxiety 60 tablet 0     Allergies   Allergen Reactions     Penicillins      BP Readings from Last 3 Encounters:   19 112/60   19 120/84   19 112/74    Wt Readings from Last 3 Encounters:   19 67.1 kg (148 lb)   19 60.9 kg (134 lb 3.2 oz)   16 62.1 kg (137 lb)                    Reviewed and updated as needed this visit by Provider  Allergies  Meds  Problems  Med Hx  Surg Hx         Review of Systems   ROS COMP: Constitutional, HEENT, cardiovascular, pulmonary, GI, , musculoskeletal, neuro, skin, endocrine and psych systems are  negative, except as otherwise noted.      Objective    /60   Pulse 78   Temp 98.1  F (36.7  C) (Temporal)   Resp 16   Ht 1.524 m (5')   Wt 67.1 kg (148 lb)   LMP 11/12/2019 (Approximate)   SpO2 99%   BMI 28.90 kg/m    Body mass index is 28.9 kg/m .  Physical Exam   GENERAL: healthy, alert and no distress  EYES: Eyes grossly normal to inspection, PERRL and conjunctivae and sclerae normal  HENT: ear canals and TM's normal, nose and mouth without ulcers or lesions  NECK: no adenopathy, no asymmetry, masses, or scars and thyroid normal to palpation  RESP: lungs clear to auscultation - no rales, rhonchi or wheezes  CV: regular rate and rhythm, normal S1 S2, no S3 or S4, no murmur, click or rub, no peripheral edema and peripheral pulses strong  ABDOMEN: soft, nontender, no hepatosplenomegaly, no masses and bowel sounds normal  MS: no gross musculoskeletal defects noted, no edema  SKIN: no suspicious lesions or rashes  NEURO: Normal strength and tone, sensory exam grossly normal, mentation intact and cranial nerves 2-12 intact  PSYCH: mentation appears normal, affect normal/bright    Diagnostic Test Results:  Labs reviewed in Epic  No results found for this or any previous visit (from the past 24 hour(s)).        Assessment & Plan     1. Lightheadedness  - CBC with platelets  - TSH with free T4 reflex  - Comprehensive metabolic panel (BMP + Alb, Alk Phos, ALT, AST, Total. Bili, TP)    2. Other fatigue  - Vitamin D Deficiency    3. Situational anxiety  - hydrOXYzine (ATARAX) 10 MG tablet; Take 1-2 tablets (10-20 mg) by mouth every 4 hours as needed for anxiety  Dispense: 60 tablet; Refill: 0    Unclear etiology of presenting symptoms at this time. Discussed with patient that certainly could be related to anxiety. Will obtain baseline labs as above. Patient inquires about using something as needed for anxiety. Does not want to be on a daily medication and wants something very gentle. Options discussed. Will  trial patient on Hydroxyzine 10-20 mg as needed for anxiety or sleep. Appropriate use and side effects discussed. Discussed red flag symptoms that should prompt immediate care in the ER. Patient will follow-up in clinic if new symptoms develop or current symptoms fail to improve.    The patient indicates understanding of these issues and agrees with the plan.    Caridad Josue PA-C  [facility]

## 2019-11-22 ENCOUNTER — OFFICE VISIT (OUTPATIENT)
Dept: FAMILY MEDICINE | Facility: OTHER | Age: 44
End: 2019-11-22
Payer: COMMERCIAL

## 2019-11-22 VITALS
SYSTOLIC BLOOD PRESSURE: 112 MMHG | TEMPERATURE: 98.1 F | OXYGEN SATURATION: 99 % | BODY MASS INDEX: 29.06 KG/M2 | DIASTOLIC BLOOD PRESSURE: 60 MMHG | HEIGHT: 60 IN | HEART RATE: 78 BPM | RESPIRATION RATE: 16 BRPM | WEIGHT: 148 LBS

## 2019-11-22 DIAGNOSIS — R42 LIGHTHEADEDNESS: Primary | ICD-10-CM

## 2019-11-22 DIAGNOSIS — R53.83 OTHER FATIGUE: ICD-10-CM

## 2019-11-22 DIAGNOSIS — F41.8 SITUATIONAL ANXIETY: ICD-10-CM

## 2019-11-22 LAB
ALBUMIN SERPL-MCNC: 4.2 G/DL (ref 3.4–5)
ALP SERPL-CCNC: 64 U/L (ref 40–150)
ALT SERPL W P-5'-P-CCNC: 17 U/L (ref 0–50)
ANION GAP SERPL CALCULATED.3IONS-SCNC: 6 MMOL/L (ref 3–14)
AST SERPL W P-5'-P-CCNC: 14 U/L (ref 0–45)
BILIRUB SERPL-MCNC: 0.4 MG/DL (ref 0.2–1.3)
BUN SERPL-MCNC: 17 MG/DL (ref 7–30)
CALCIUM SERPL-MCNC: 9 MG/DL (ref 8.5–10.1)
CHLORIDE SERPL-SCNC: 102 MMOL/L (ref 94–109)
CO2 SERPL-SCNC: 29 MMOL/L (ref 20–32)
CREAT SERPL-MCNC: 0.87 MG/DL (ref 0.52–1.04)
ERYTHROCYTE [DISTWIDTH] IN BLOOD BY AUTOMATED COUNT: 12.8 % (ref 10–15)
GFR SERPL CREATININE-BSD FRML MDRD: 80 ML/MIN/{1.73_M2}
GLUCOSE SERPL-MCNC: 89 MG/DL (ref 70–99)
HCT VFR BLD AUTO: 41.8 % (ref 35–47)
HGB BLD-MCNC: 13.4 G/DL (ref 11.7–15.7)
MCH RBC QN AUTO: 30.2 PG (ref 26.5–33)
MCHC RBC AUTO-ENTMCNC: 32.1 G/DL (ref 31.5–36.5)
MCV RBC AUTO: 94 FL (ref 78–100)
PLATELET # BLD AUTO: 304 10E9/L (ref 150–450)
POTASSIUM SERPL-SCNC: 3.7 MMOL/L (ref 3.4–5.3)
PROT SERPL-MCNC: 8.1 G/DL (ref 6.8–8.8)
RBC # BLD AUTO: 4.44 10E12/L (ref 3.8–5.2)
SODIUM SERPL-SCNC: 137 MMOL/L (ref 133–144)
TSH SERPL DL<=0.005 MIU/L-ACNC: 3.36 MU/L (ref 0.4–4)
WBC # BLD AUTO: 9.5 10E9/L (ref 4–11)

## 2019-11-22 PROCEDURE — 80053 COMPREHEN METABOLIC PANEL: CPT | Performed by: PHYSICIAN ASSISTANT

## 2019-11-22 PROCEDURE — 99214 OFFICE O/P EST MOD 30 MIN: CPT | Performed by: PHYSICIAN ASSISTANT

## 2019-11-22 PROCEDURE — 84443 ASSAY THYROID STIM HORMONE: CPT | Performed by: PHYSICIAN ASSISTANT

## 2019-11-22 PROCEDURE — 82306 VITAMIN D 25 HYDROXY: CPT | Performed by: PHYSICIAN ASSISTANT

## 2019-11-22 PROCEDURE — 36415 COLL VENOUS BLD VENIPUNCTURE: CPT | Performed by: PHYSICIAN ASSISTANT

## 2019-11-22 PROCEDURE — 85027 COMPLETE CBC AUTOMATED: CPT | Performed by: PHYSICIAN ASSISTANT

## 2019-11-22 RX ORDER — HYDROXYZINE HYDROCHLORIDE 10 MG/1
10-20 TABLET, FILM COATED ORAL EVERY 4 HOURS PRN
Qty: 60 TABLET | Refills: 0 | Status: SHIPPED | OUTPATIENT
Start: 2019-11-22 | End: 2023-11-03

## 2019-11-22 ASSESSMENT — MIFFLIN-ST. JEOR: SCORE: 1237.82

## 2019-11-22 NOTE — LETTER
November 25, 2019      Mecca CALI Oniel  [address] [address] [place] [id number]        Dear ,    We are writing to inform you of your test results.    Resulted Orders   CBC with platelets   Result Value Ref Range    WBC 9.5 4.0 - 11.0 10e9/L    RBC Count 4.44 3.8 - 5.2 10e12/L    Hemoglobin 13.4 11.7 - 15.7 g/dL    Hematocrit 41.8 35.0 - 47.0 %    MCV 94 78 - 100 fl    MCH 30.2 26.5 - 33.0 pg    MCHC 32.1 31.5 - 36.5 g/dL    RDW 12.8 10.0 - 15.0 %    Platelet Count 304 150 - 450 10e9/L   TSH with free T4 reflex   Result Value Ref Range    TSH 3.36 0.40 - 4.00 mU/L   Comprehensive metabolic panel (BMP + Alb, Alk Phos, ALT, AST, Total. Bili, TP)   Result Value Ref Range    Sodium 137 133 - 144 mmol/L    Potassium 3.7 3.4 - 5.3 mmol/L    Chloride 102 94 - 109 mmol/L    Carbon Dioxide 29 20 - 32 mmol/L    Anion Gap 6 3 - 14 mmol/L    Glucose 89 70 - 99 mg/dL    Urea Nitrogen 17 7 - 30 mg/dL    Creatinine 0.87 0.52 - 1.04 mg/dL    GFR Estimate 80 >60 mL/min/[1.73_m2]      Comment:      Non  GFR Calc  Starting 12/18/2018, serum creatinine based estimated GFR (eGFR) will be   calculated using the Chronic Kidney Disease Epidemiology Collaboration   (CKD-EPI) equation.      GFR Estimate If Black >90 >60 mL/min/[1.73_m2]      Comment:       GFR Calc  Starting 12/18/2018, serum creatinine based estimated GFR (eGFR) will be   calculated using the Chronic Kidney Disease Epidemiology Collaboration   (CKD-EPI) equation.      Calcium 9.0 8.5 - 10.1 mg/dL    Bilirubin Total 0.4 0.2 - 1.3 mg/dL    Albumin 4.2 3.4 - 5.0 g/dL    Protein Total 8.1 6.8 - 8.8 g/dL    Alkaline Phosphatase 64 40 - 150 U/L    ALT 17 0 - 50 U/L    AST 14 0 - 45 U/L   Vitamin D Deficiency   Result Value Ref Range    Vitamin D Deficiency screening 27 20 - 75 ug/L      Comment:      Season, race, dietary intake, and treatment affect the concentration of   25-hydroxy-Vitamin D. Values may decrease during winter months and  increase   during summer months. Values 20-29 ug/L may indicate Vitamin D insufficiency   and values <20 ug/L may indicate Vitamin D deficiency.  Vitamin D determination is routinely performed by an immunoassay specific for   25 hydroxyvitamin D3.  If an individual is on vitamin D2 (ergocalciferol)   supplementation, please specify 25 OH vitamin D2 and D3 level determination by   LCMSMS test [id number].         If you have any questions or concerns, please call the clinic at the number listed above.       Sincerely,        Caridad Josue PA-C

## 2019-11-24 LAB — DEPRECATED CALCIDIOL+CALCIFEROL SERPL-MC: 27 UG/L (ref 20–75)

## 2019-11-24 NOTE — RESULT ENCOUNTER NOTE
Please notify patient that all of her labs were normal. Patient would like a phone call and letter sent.     Caridad Josue PA-C

## 2019-12-17 ENCOUNTER — TELEPHONE (OUTPATIENT)
Dept: FAMILY MEDICINE | Facility: CLINIC | Age: 44
End: 2019-12-17

## 2019-12-17 NOTE — LETTER
[address] [address]  Phone: [phone]  Fax: [phone]  December 31, 2019      Mecca Engle  [address] [address] [place] [id number]      Dear Mecca,    We care about your health and have reviewed your health plan including your medical conditions, medications, and lab results.  Based on this review, it is recommended that you follow up regarding the following health topic(s):  -Cervical Cancer Screening  -Wellness (Physical) Visit     We recommend you take the following action(s):  -schedule a WELLNESS (Physical) APPOINTMENT.  We will perform the following labs: Lipids (fasting cholesterol - nothing to eat except water and/or meds for 8-10 hours).  -schedule a PAP SMEAR EXAM which is due.  Please disregard this reminder if you have had this exam elsewhere within the last year.  It would be helpful for us to have a copy of your recent pap smear report to update your records.     Please call us at [phone] (or use [employer]) to address the above recommendations.     Thank you for trusting [facility] and we appreciate the opportunity to serve you.  We look forward to supporting your healthcare needs in the future.    Healthy Regards,    Your Health Care Team  [facility]

## 2019-12-17 NOTE — TELEPHONE ENCOUNTER
Summary:    Patient is due/failing the following:   PAP, LDL and PHYSICAL    Action needed:   Patient needs office visit for Physical and PAP with fasting lab.    Type of outreach:    Phone, left message for patient to call back.     Questions for provider review:    None                                                                                                                                    Kimberley Cole CMA       Chart routed to Care Team .          Panel Management Review      Patient has the following on her problem list: None      Composite cancer screening  Chart review shows that this patient is due/due soon for the following Pap Smear

## 2020-03-08 ENCOUNTER — OFFICE VISIT (OUTPATIENT)
Dept: URGENT CARE | Facility: RETAIL CLINIC | Age: 45
End: 2020-03-08
Payer: COMMERCIAL

## 2020-03-08 VITALS
HEART RATE: 75 BPM | DIASTOLIC BLOOD PRESSURE: 72 MMHG | SYSTOLIC BLOOD PRESSURE: 104 MMHG | OXYGEN SATURATION: 100 % | TEMPERATURE: 98.3 F

## 2020-03-08 DIAGNOSIS — J02.0 STREP THROAT: Primary | ICD-10-CM

## 2020-03-08 DIAGNOSIS — J02.9 ACUTE PHARYNGITIS, UNSPECIFIED ETIOLOGY: ICD-10-CM

## 2020-03-08 LAB — S PYO AG THROAT QL IA.RAPID: NORMAL

## 2020-03-08 PROCEDURE — 87880 STREP A ASSAY W/OPTIC: CPT | Mod: QW | Performed by: NURSE PRACTITIONER

## 2020-03-08 PROCEDURE — 99213 OFFICE O/P EST LOW 20 MIN: CPT | Performed by: NURSE PRACTITIONER

## 2020-03-08 PROCEDURE — 87081 CULTURE SCREEN ONLY: CPT | Performed by: NURSE PRACTITIONER

## 2020-03-08 RX ORDER — AZITHROMYCIN 250 MG/1
TABLET, FILM COATED ORAL
Qty: 6 TABLET | Refills: 0 | Status: SHIPPED | OUTPATIENT
Start: 2020-03-08 | End: 2020-07-30

## 2020-03-08 ASSESSMENT — ENCOUNTER SYMPTOMS
FATIGUE: 1
COUGH: 0
MYALGIAS: 0
SLEEP DISTURBANCE: 0
DIAPHORESIS: 0
TROUBLE SWALLOWING: 1
NAUSEA: 0
HEADACHES: 0
SORE THROAT: 1
FEVER: 0
CHILLS: 0
ADENOPATHY: 1
VOICE CHANGE: 1

## 2020-03-08 NOTE — PROGRESS NOTES
Chief Complaint   Patient presents with     Pharyngitis     started yesterday     SUBJECTIVE:  Mecca Engle is a 44 year old female presenting with her children with a chief complaint of a sore throat, fatigue starting last night.  Course of illness: sudden onset and worsening.  Severity: moderate  Treatment measures tried include: Tylenol/Ibuprofen.  Predisposing factors include: 5 positive streps in house hold.    Past Medical History:   Diagnosis Date     Abnormal Papanicolaou smear of cervix and cervical HPV 1993    s/p cryotherapy     Supervision of high-risk pregnancy with grand multiparity      hydrOXYzine (ATARAX) 10 MG tablet, Take 1-2 tablets (10-20 mg) by mouth every 4 hours as needed for anxiety (Patient not taking: Reported on 3/8/2020)    No current facility-administered medications on file prior to visit.     Social History     Tobacco Use     Smoking status: Never Smoker     Smokeless tobacco: Never Used   Substance Use Topics     Alcohol use: No     Allergies   Allergen Reactions     Penicillins      Review of Systems   Constitutional: Positive for fatigue. Negative for chills, diaphoresis and fever.   HENT: Positive for sore throat, trouble swallowing and voice change. Negative for congestion and ear pain.    Respiratory: Negative for cough.    Gastrointestinal: Negative for nausea.   Musculoskeletal: Negative for myalgias.   Skin: Negative for rash.   Neurological: Negative for headaches.   Hematological: Positive for adenopathy.   Psychiatric/Behavioral: Negative for sleep disturbance.     OBJECTIVE:   /72   Pulse 75   Temp 98.3  F (36.8  C) (Oral)   SpO2 100%      Physical Exam  Constitutional:       General: She is not in acute distress.     Appearance: She is well-developed. She is ill-appearing. She is not toxic-appearing or diaphoretic.   HENT:      Head: Normocephalic and atraumatic.      Mouth/Throat:      Pharynx: Oropharyngeal exudate and posterior oropharyngeal erythema  "present.   Eyes:      Conjunctiva/sclera: Conjunctivae normal.      Pupils: Pupils are equal, round, and reactive to light.   Neck:      Musculoskeletal: Normal range of motion and neck supple.   Cardiovascular:      Rate and Rhythm: Normal rate.   Pulmonary:      Effort: Pulmonary effort is normal. No respiratory distress.   Musculoskeletal: Normal range of motion.   Lymphadenopathy:      Cervical: Cervical adenopathy present.   Skin:     General: Skin is warm.      Capillary Refill: Capillary refill takes less than 2 seconds.      Findings: No rash.   Neurological:      General: No focal deficit present.      Mental Status: She is alert and oriented to person, place, and time.   Psychiatric:         Mood and Affect: Mood normal.         Behavior: Behavior normal.       Results for orders placed or performed in visit on 03/08/20   Rapid Strep Screen Throat Swab     Status: Normal    Specimen: Throat   Result Value Ref Range    Rapid Strep A Screen neg neg     ASSESSMENT:    ICD-10-CM    1. Strep throat  J02.0 azithromycin (ZITHROMAX) 250 MG tablet   2. Acute pharyngitis, unspecified etiology  J02.9 Rapid Strep Screen Throat Swab     BETA STREP GROUP A R/O CULTURE     PLAN:   Patient Instructions   Antibiotics as directed. Treating for strep given symptom and 5 positives in house.  Will call if culture returns positive in 1-2 days.  Return to work 24 hours after starting antibiotic and fever free for 24 hours.  Drink plenty of fluids and rest.  May use salt water gargles- about 8 oz warm water with about 1 teaspoon salt  Sucrets and Cepacol spray are over the counter medications that numb the throat.  Over the counter pain relievers such as tylenol or ibuprofen may be used as needed.   Honey lemon tea helps to soothe the throat. \"Throat Coat\" tea is soothing as well.  Change toothbrush after 24 hours of antibiotics (may soak in 3-6% hydrogen peroxide)  Will be contagious for 24 hours after starting antibiotic  May " return to school//work/activities 24 hours after antibiotics are started.  Wash hands frequently and do not share beverages.  Please follow up with primary care provider if symptoms are not improving, worsening or new symptoms or for any adverse reactions to medications.    Follow up with primary care provider with any problems, questions or concerns or if symptoms worsen or fail to improve. Patient agreed to plan and verbalized understanding.    JLEANI Nguyen  [facility] [place]

## 2020-03-08 NOTE — PATIENT INSTRUCTIONS
"Antibiotics as directed. Treating for strep given symptom and 5 positives in house.  Will call if culture returns positive in 1-2 days.  Return to work 24 hours after starting antibiotic and fever free for 24 hours.  Drink plenty of fluids and rest.  May use salt water gargles- about 8 oz warm water with about 1 teaspoon salt  Sucrets and Cepacol spray are over the counter medications that numb the throat.  Over the counter pain relievers such as tylenol or ibuprofen may be used as needed.   Honey lemon tea helps to soothe the throat. \"Throat Coat\" tea is soothing as well.  Change toothbrush after 24 hours of antibiotics (may soak in 3-6% hydrogen peroxide)  Will be contagious for 24 hours after starting antibiotic  May return to school//work/activities 24 hours after antibiotics are started.  Wash hands frequently and do not share beverages.  Please follow up with primary care provider if symptoms are not improving, worsening or new symptoms or for any adverse reactions to medications.  "

## 2020-03-10 LAB
BACTERIA SPEC CULT: NORMAL
SPECIMEN SOURCE: NORMAL

## 2020-07-30 ENCOUNTER — TELEPHONE (OUTPATIENT)
Dept: FAMILY MEDICINE | Facility: OTHER | Age: 45
End: 2020-07-30

## 2020-07-30 ENCOUNTER — VIRTUAL VISIT (OUTPATIENT)
Dept: FAMILY MEDICINE | Facility: OTHER | Age: 45
End: 2020-07-30
Payer: COMMERCIAL

## 2020-07-30 DIAGNOSIS — Z20.822 SUSPECTED 2019 NOVEL CORONAVIRUS INFECTION: Primary | ICD-10-CM

## 2020-07-30 DIAGNOSIS — Z20.822 SUSPECTED 2019 NOVEL CORONAVIRUS INFECTION: ICD-10-CM

## 2020-07-30 DIAGNOSIS — F51.04 PSYCHOPHYSIOLOGICAL INSOMNIA: ICD-10-CM

## 2020-07-30 DIAGNOSIS — F41.8 SITUATIONAL ANXIETY: Primary | ICD-10-CM

## 2020-07-30 PROCEDURE — 99421 OL DIG E/M SVC 5-10 MIN: CPT | Performed by: PHYSICIAN ASSISTANT

## 2020-07-30 PROCEDURE — 99214 OFFICE O/P EST MOD 30 MIN: CPT | Mod: 95 | Performed by: PHYSICIAN ASSISTANT

## 2020-07-30 PROCEDURE — 96127 BRIEF EMOTIONAL/BEHAV ASSMT: CPT | Mod: 95 | Performed by: PHYSICIAN ASSISTANT

## 2020-07-30 PROCEDURE — U0003 INFECTIOUS AGENT DETECTION BY NUCLEIC ACID (DNA OR RNA); SEVERE ACUTE RESPIRATORY SYNDROME CORONAVIRUS 2 (SARS-COV-2) (CORONAVIRUS DISEASE [COVID-19]), AMPLIFIED PROBE TECHNIQUE, MAKING USE OF HIGH THROUGHPUT TECHNOLOGIES AS DESCRIBED BY CMS-2020-01-R: HCPCS | Performed by: FAMILY MEDICINE

## 2020-07-30 RX ORDER — SERTRALINE HYDROCHLORIDE 25 MG/1
25 TABLET, FILM COATED ORAL DAILY
Qty: 30 TABLET | Refills: 1 | Status: SHIPPED | OUTPATIENT
Start: 2020-07-30 | End: 2023-11-03

## 2020-07-30 ASSESSMENT — ANXIETY QUESTIONNAIRES
6. BECOMING EASILY ANNOYED OR IRRITABLE: MORE THAN HALF THE DAYS
GAD7 TOTAL SCORE: 16
3. WORRYING TOO MUCH ABOUT DIFFERENT THINGS: NEARLY EVERY DAY
7. FEELING AFRAID AS IF SOMETHING AWFUL MIGHT HAPPEN: MORE THAN HALF THE DAYS
2. NOT BEING ABLE TO STOP OR CONTROL WORRYING: MORE THAN HALF THE DAYS
5. BEING SO RESTLESS THAT IT IS HARD TO SIT STILL: SEVERAL DAYS
1. FEELING NERVOUS, ANXIOUS, OR ON EDGE: NEARLY EVERY DAY
IF YOU CHECKED OFF ANY PROBLEMS ON THIS QUESTIONNAIRE, HOW DIFFICULT HAVE THESE PROBLEMS MADE IT FOR YOU TO DO YOUR WORK, TAKE CARE OF THINGS AT HOME, OR GET ALONG WITH OTHER PEOPLE: SOMEWHAT DIFFICULT

## 2020-07-30 ASSESSMENT — PATIENT HEALTH QUESTIONNAIRE - PHQ9
SUM OF ALL RESPONSES TO PHQ QUESTIONS 1-9: 11
5. POOR APPETITE OR OVEREATING: NEARLY EVERY DAY

## 2020-07-30 NOTE — PROGRESS NOTES
"Date: 2020 11:40:48  Clinician: Kevin Huffman  Clinician NPI: [id number]  Patient: Mecca Engle  Patient : 1975  Patient Address: [address] [place] [id number]  Patient Phone: [phone]  Visit Protocol: URI  Patient Summary:  Mecca is a 45 year old ( : 1975 ) female who initiated a Visit for COVID-19 (Coronavirus) evaluation and screening. When asked the question \"Please sign me up to receive news, health information and promotions from [email or web address].\", Mecca responded \"No\".    Mecca states her symptoms started 1-2 days ago.   Her symptoms consist of nausea, myalgia, a sore throat, vomiting, malaise, a headache, and chills. Mecca also feels feverish but was unable to measure her temperature.   Symptom details     Sore throat: Mecca reports having mild throat pain (1-3 on a 10 point pain scale), does not have exudate on her tonsils, and can swallow liquids. The lymph nodes in her neck are not enlarged. A rash has not appeared on the skin since the sore throat started.     Headache: She states the headache is moderate (4-6 on a 10 point pain scale).      Mecca denies having wheezing, teeth pain, ageusia, diarrhea, anosmia, facial pain or pressure, cough, nasal congestion, rhinitis, ear pain, and enlarged lymph nodes. She also denies having recent facial or sinus surgery in the past 60 days, taking antibiotic medication in the past month, and having a sinus infection within the past year. She is not experiencing dyspnea.   Precipitating events  Within the past week, Mecca has not been exposed to someone with strep throat. She has not recently been exposed to someone with influenza. Mecca has been in close contact with the following high risk individuals: pregnant women and children under the age of 5.   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   She does not work or volunteer as healthcare worker or a  " and does not work or volunteer in a healthcare facility.   Mecca has not lived in a congregate living setting in the past 14 days. She lives with a healthcare worker.   Mecca has not had a close contact with a laboratory-confirmed COVID-19 patient within 14 days of symptom onset.   Pertinent medical history  Mecca does not get yeast infections when she takes antibiotics.   Mecca needs a return to work/school note.   Weight: 125 lbs   Mecca does not smoke or use smokeless tobacco.   She denies pregnancy and denies breastfeeding. She has menstruated in the past month.   Weight: 125 lbs    MEDICATIONS: No current medications, ALLERGIES: Penicillins  Clinician Response:  Dear Mecca,   Your symptoms show that you may have coronavirus (COVID-19). This illness can cause fever, cough and trouble breathing. Many people get a mild case and get better on their own. Some people can get very sick.  What should I do?  We would like to test you for this virus.   1. Please call [phone] to schedule your visit. Explain that you were referred by [facility] to have a COVID-19 test. Be ready to share your [facility] visit ID number.  The following will serve as your written order for this COVID Test, ordered by me, for the indication of suspected COVID [Z20.828]: The test will be ordered in [employer], our electronic health record, after you are scheduled. It will show as ordered and authorized by Abimael Ordaz MD.  Order: COVID-19 (Coronavirus) PCR for SYMPTOMATIC testing from [facility].      2. When it's time for your COVID test:  Stay at least 6 feet away from others. (If someone will drive you to your test, stay in the backseat, as far away from the  as you can.)   Cover your mouth and nose with a mask, tissue or washcloth.  Go straight to the testing site. Don't make any stops on the way there or back.      3.Starting now: Stay home and away from others (self-isolate) until:   You've had no fever---and no medicine that reduces fever---for  "3 full days (72 hours). And...   Your other symptoms have gotten better. For example, your cough or breathing has improved. And...   At least 10 days have passed since your symptoms started.       During this time, don't leave the house except for testing or medical care.   Stay in your own room, even for meals. Use your own bathroom if you can.   Stay away from others in your home. No hugging, kissing or shaking hands. No visitors.  Don't go to work, school or anywhere else.    Clean \"high touch\" surfaces often (doorknobs, counters, handles, etc.). Use a household cleaning spray or wipes. You'll find a full list of  on the EPA website: www.epa.gov/pesticide-registration/list-n-disinfectants-use-against-sars-cov-2.   Cover your mouth and nose with a mask, tissue or washcloth to avoid spreading germs.  Wash your hands and face often. Use soap and water.  Caregivers in these groups are at risk for severe illness due to COVID-19:  o People 65 years and older  o People who live in a nursing home or long-term care facility  o People with chronic disease (lung, heart, cancer, diabetes, kidney, liver, immunologic)  o People who have a weakened immune system, including those who:   Are in cancer treatment  Take medicine that weakens the immune system, such as corticosteroids  Had a bone marrow or organ transplant  Have an immune deficiency  Have poorly controlled HIV or AIDS  Are obese (body mass index of 40 or higher)  Smoke regularly   o Caregivers should wear gloves while washing dishes, handling laundry and cleaning bedrooms and bathrooms.  o Use caution when washing and drying laundry: Don't shake dirty laundry, and use the warmest water setting that you can.  o For more tips, go to www.cdc.gov/coronavirus/2019-ncov/downloads/10Things.pdf.    4.Sign up for [employer]. We know it's scary to hear that you might have COVID-19. We want to track your symptoms to make sure you're okay over the next 2 weeks. Please " look for an email from [employer]---this is a free, online program that we'll use to keep in touch. To sign up, follow the link in the email. Learn more at [email or web address]  How can I take care of myself?   Get lots of rest. Drink extra fluids (unless a doctor has told you not to).   Take Tylenol (acetaminophen) for fever or pain. If you have liver or kidney problems, ask your family doctor if it's okay to take Tylenol.   Adults can take either:    650 mg (two 325 mg pills) every 4 to 6 hours, or...   1,000 mg (two 500 mg pills) every 8 hours as needed.    Note: Don't take more than 3,000 mg in one day. Acetaminophen is found in many medicines (both prescribed and over-the-counter medicines). Read all labels to be sure you don't take too much.   For children, check the Tylenol bottle for the right dose. The dose is based on the child's age or weight.    If you have other health problems (like cancer, heart failure, an organ transplant or severe kidney disease): Call your specialty clinic if you don't feel better in the next 2 days.       Know when to call 911. Emergency warning signs include:    Trouble breathing or shortness of breath Pain or pressure in the chest that doesn't go away Feeling confused like you haven't felt before, or not being able to wake up Bluish-colored lips or face.  Where can I get more information?   [facility] -- About COVID-19: [email or web address]   CDC -- What to Do If You're Sick: www.cdc.gov/coronavirus/2019-ncov/about/steps-when-sick.html   CDC -- Ending Home Isolation: www.cdc.gov/coronavirus/2019-ncov/hcp/disposition-in-home-patients.html   CDC -- Caring for Someone: www.cdc.gov/coronavirus/2019-ncov/if-you-are-sick/care-for-someone.html   [facility] -- Interim Guidance for Hospital Discharge to Home: www.health.[facility].[place]/diseases/coronavirus/hcp/hospdischarge.pdf   [facility] clinical trials (COVID-19 research studies):  clinicalaffairs.[facility]-clinical-trials    Below are the COVID-19 hotlines at the [place] Department of Health ([facility]). Interpreters are available.    For health questions: Call 098-562-9336 or 1-543.248.5579 (7 a.m. to 7 p.m.) For questions about schools and childcare: Call 454-607-1811 or 1-503.433.6914 (7 a.m. to 7 p.m.)    Diagnosis: Pain in throat  Diagnosis ICD: R07.0

## 2020-07-30 NOTE — TELEPHONE ENCOUNTER
Reason for Call:  Other call back    Detailed comments: Patient called clinic stating the Hydroxyzine that was prescribed for her anxiety is not working. She would like to  Be prescribed something else. She wants a call before scheduling an appt.     Phone Number Patient can be reached at: Cell number on file:    Telephone Information:   Mobile [phone]       Best Time: any    Can we leave a detailed message on this number? YES    Call taken on 7/30/2020 at 8:38 AM by Elaina Levy

## 2020-07-30 NOTE — TELEPHONE ENCOUNTER
I ave not seen patient since November. She is also due for physical and pap. Would encourage patient schedule a visit for this and we can certainly disuses other options for anxiety at that time.    Caridad Josue PA-C

## 2020-07-30 NOTE — PROGRESS NOTES
"Mecca Engle is a 45 year old female who is being evaluated via a billable telephone visit.      The patient has been notified of following:     \"This telephone visit will be conducted via a call between you and your physician/provider. We have found that certain health care needs can be provided without the need for a physical exam.  This service lets us provide the care you need with a short phone conversation.  If a prescription is necessary we can send it directly to your pharmacy.  If lab work is needed we can place an order for that and you can then stop by our lab to have the test done at a later time.    Telephone visits are billed at different rates depending on your insurance coverage. During this emergency period, for some insurers they may be billed the same as an in-person visit.  Please reach out to your insurance provider with any questions.    If during the course of the call the physician/provider feels a telephone visit is not appropriate, you will not be charged for this service.\"    Patient has given verbal consent for Telephone visit?  Yes    What phone number would you like to be contacted at? [phone]     How would you like to obtain your AVS? Mail a copy    Subjective     Mecca Engle is a 45 year old female who presents via phone visit today for the following health issues:    HPI    Anxiety Follow-Up--    How are you doing with your anxiety since your last visit? Worsened.  Patient states her anxiety is situational.  It is something that she has noted over the past several years.  She has a new job which is good, it is a job that she has wanted for a long time but it is more stressful as she is getting accustomed to it.    She is not sleeping well now due to anxiety.  At times she feels that her anxiety is \"debilitating.\"  Other things that increase her anxiety other than work are driving in traffic.  She was prescribed hydroxyzine in the past she did take it once for anxiety but it " made her far too sleepy and she has not taken it since.  She is not aware that it can be used for sleep.  She wonders what options she has in treating her anxiety initially she was not interested in a chronic daily medication but may be willing to take 1 now.  She is concerned about weight gain    Are you having other symptoms that might be associated with anxiety? Yes.    Have you had a significant life event? Yes.    Are you feeling depressed? No. Anxiety only.     Do you have any concerns with your use of alcohol or other drugs? No    Social History     Tobacco Use     Smoking status: Never Smoker     Smokeless tobacco: Never Used   Substance Use Topics     Alcohol use: No     Drug use: No     CIHDI-7 SCORE 3/19/2019 7/30/2020   Total Score 9 (mild anxiety) -   Total Score 9 16     PHQ 3/19/2019 7/30/2020   PHQ-9 Total Score 8 11   Q9: Thoughts of better off dead/self-harm past 2 weeks Not at all Not at all     Last PHQ-9 7/30/2020   1.  Little interest or pleasure in doing things 1   2.  Feeling down, depressed, or hopeless 0   3.  Trouble falling or staying asleep, or sleeping too much 2   4.  Feeling tired or having little energy 2   5.  Poor appetite or overeating 2   6.  Feeling bad about yourself 2   7.  Trouble concentrating 2   8.  Moving slowly or restless 0   Q9: Thoughts of better off dead/self-harm past 2 weeks 0   PHQ-9 Total Score 11   Difficulty at work, home, or with people Somewhat difficult     CHIDI-7  7/30/2020   1. Feeling nervous, anxious, or on edge 3   2. Not being able to stop or control worrying 2   3. Worrying too much about different things 3   4. Trouble relaxing 3   5. Being so restless that it is hard to sit still 1   6. Becoming easily annoyed or irritable 2   7. Feeling afraid, as if something awful might happen 2   CHIDI-7 Total Score 16   If you checked any problems, how difficult have they made it for you to do your work, take care of things at home, or get along with other people?  Somewhat difficult           Patient Active Problem List   Diagnosis     Headache     Umbilical hernia     Grand multiparity with  problem     High-risk pregnancy     Open wound of nasal septum     Hydronephrosis     Elderly multigravida with antepartum condition or complication     CARDIOVASCULAR SCREENING; LDL GOAL LESS THAN 160     Psychophysiological insomnia     Situational anxiety     Past Surgical History:   Procedure Laterality Date     DILATION AND CURETTAGE SUCTION N/A 2015    Procedure: DILATION AND CURETTAGE SUCTION;  Surgeon: Cooper Moraes MD;  Location: [facility] OR     HC COLP CERVIX/UPPER VAGINA  99     HC DILATION/CURETTAGE DIAG/THER NON OB      D & C     HC REMOVAL OF TONSILS,<13 Y/O      Tonsils <11y.o.       Social History     Tobacco Use     Smoking status: Never Smoker     Smokeless tobacco: Never Used   Substance Use Topics     Alcohol use: No     Family History   Problem Relation Age of Onset     Circulatory Mother         anemia     Arthritis Mother      Gastrointestinal Disease Mother         crohns disease at age 53     Cancer Paternal Grandfather         lymph     Hypertension Maternal Aunt      Hypertension Maternal Grandfather      Cerebrovascular Disease Maternal Grandfather          age 54     Thyroid Disease Maternal Aunt      Obesity Maternal Aunt      Diabetes Maternal Aunt      Cancer - colorectal Maternal Uncle         diagnosed at age 58-living     Respiratory Other         pt son has asthma         Current Outpatient Medications   Medication Sig Dispense Refill     sertraline (ZOLOFT) 25 MG tablet Take 1 tablet (25 mg) by mouth daily 30 tablet 1     hydrOXYzine (ATARAX) 10 MG tablet Take 1-2 tablets (10-20 mg) by mouth every 4 hours as needed for anxiety (Patient not taking: Reported on 3/8/2020) 60 tablet 0     Allergies   Allergen Reactions     Penicillins      BP Readings from Last 3 Encounters:   20 104/72   19 112/60   19 120/84     Wt Readings from Last 3 Encounters:   11/22/19 67.1 kg (148 lb)   03/19/19 60.9 kg (134 lb 3.2 oz)   03/30/16 62.1 kg (137 lb)                    Reviewed and updated as needed this visit by Provider         Review of Systems   See PHQ 9 and CHIDI 7 questionnaires for symptoms.        Objective   Reported vitals:  There were no vitals taken for this visit.   healthy, alert and no distress  PSYCH: Alert and oriented times 3; coherent speech, normal   rate and volume, able to articulate logical thoughts, able   to abstract reason, no tangential thoughts, no hallucinations   or delusions  Her affect is normal and pleasant  RESP: No cough, no audible wheezing, able to talk in full sentences  Remainder of exam unable to be completed due to telephone visits    Diagnostic Test Results:  Labs reviewed in Epic  none         Assessment/Plan:    1. Situational anxiety  Discussed use of medication, common side effects, how medication works and the fact that improvement in anticipated in 4-6 weeks.  Patient would like to try a very small dosage which is appropriate.  We will continue the 25 mg dosage until her recheck in 4 to 6 weeks.  - sertraline (ZOLOFT) 25 MG tablet; Take 1 tablet (25 mg) by mouth daily  Dispense: 30 tablet; Refill: 1    2. Psychophysiological insomnia  She will try hydroxyzine to help her sleep.      Return in about 1 month (around 8/30/2020) for depression/anxiety.      Phone call duration:  15 minutes    Dana Wilson PA-C

## 2020-07-31 ASSESSMENT — ANXIETY QUESTIONNAIRES: GAD7 TOTAL SCORE: 16

## 2020-08-01 LAB
SARS-COV-2 RNA SPEC QL NAA+PROBE: NOT DETECTED
SPECIMEN SOURCE: NORMAL

## 2021-01-09 ENCOUNTER — HEALTH MAINTENANCE LETTER (OUTPATIENT)
Age: 46
End: 2021-01-09

## 2021-03-13 ENCOUNTER — HEALTH MAINTENANCE LETTER (OUTPATIENT)
Age: 46
End: 2021-03-13

## 2021-10-23 ENCOUNTER — HEALTH MAINTENANCE LETTER (OUTPATIENT)
Age: 46
End: 2021-10-23

## 2022-02-12 ENCOUNTER — HEALTH MAINTENANCE LETTER (OUTPATIENT)
Age: 47
End: 2022-02-12

## 2022-04-09 ENCOUNTER — HEALTH MAINTENANCE LETTER (OUTPATIENT)
Age: 47
End: 2022-04-09

## 2022-10-09 ENCOUNTER — HEALTH MAINTENANCE LETTER (OUTPATIENT)
Age: 47
End: 2022-10-09

## 2023-02-18 ENCOUNTER — HEALTH MAINTENANCE LETTER (OUTPATIENT)
Age: 48
End: 2023-02-18

## 2023-05-21 ENCOUNTER — HEALTH MAINTENANCE LETTER (OUTPATIENT)
Age: 48
End: 2023-05-21

## 2023-10-29 ENCOUNTER — HOSPITAL ENCOUNTER (EMERGENCY)
Facility: CLINIC | Age: 48
Discharge: HOME | End: 2023-10-29
Attending: PHYSICIAN ASSISTANT | Admitting: PHYSICIAN ASSISTANT
Payer: OTHER MISCELLANEOUS

## 2023-10-29 ENCOUNTER — APPOINTMENT (OUTPATIENT)
Dept: GENERAL RADIOLOGY | Facility: CLINIC | Age: 48
End: 2023-10-29
Attending: PHYSICIAN ASSISTANT
Payer: OTHER MISCELLANEOUS

## 2023-10-29 VITALS
SYSTOLIC BLOOD PRESSURE: 128 MMHG | OXYGEN SATURATION: 99 % | TEMPERATURE: 98 F | DIASTOLIC BLOOD PRESSURE: 75 MMHG | HEART RATE: 80 BPM | RESPIRATION RATE: 16 BRPM

## 2023-10-29 DIAGNOSIS — S60.221A CONTUSION OF RIGHT HAND: ICD-10-CM

## 2023-10-29 PROCEDURE — 99283 EMERGENCY DEPT VISIT LOW MDM: CPT

## 2023-10-29 PROCEDURE — 73130 X-RAY EXAM OF HAND: CPT | Mod: RT

## 2023-10-29 PROCEDURE — 99283 EMERGENCY DEPT VISIT LOW MDM: CPT | Performed by: PHYSICIAN ASSISTANT

## 2023-10-29 ASSESSMENT — ACTIVITIES OF DAILY LIVING (ADL): ADLS_ACUITY_SCORE: 35
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
## 2023-10-29 NOTE — ED TRIAGE NOTES
Pt has injury to right upper extremity.  Pain from right index finger through right hand into forearm. CMS intact, pain 10/10.  EMS brought in pt, VSS, denies SOB, CP or any other symptoms at this time.     Triage Assessment (Adult)       Row Name 10/29/23 1819          Triage Assessment    Airway WDL WDL        Respiratory WDL    Respiratory WDL WDL        Skin Circulation/Temperature WDL    Skin Circulation/Temperature WDL WDL        Cardiac WDL    Cardiac WDL WDL     Cardiac Rhythm NSR        Peripheral/Neurovascular WDL    Peripheral Neurovascular WDL WDL        Cognitive/Neuro/Behavioral WDL    Cognitive/Neuro/Behavioral WDL WDL
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
## 2023-10-30 NOTE — ED NOTES
Pt given procare splint without thumb spica at this time.  Discharged with all paperwork and note for work.  Pt able to ambulate out of ER at this time with  and not in distress at time of exit.

## 2023-10-30 NOTE — DISCHARGE INSTRUCTIONS
It was a pleasure working with you today!  I hope your condition improves rapidly!     Thankfully, your x-ray did not show any sign of fracture.  You have a deep bone bruise of your hand.  Please elevate the hand as much as possible above heart level for the next couple days.  Ice the painful area for 15 minutes every couple hours.  It is okay to use ibuprofen 600 mg every 6 hours as needed for discomfort or inflammation.  Wear your wrist/hand brace at all times for support.  The orthopedic department should be contacting you tomorrow to line up a recheck appointment hopefully the end of this week to reevaluate.
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
## 2023-11-03 ENCOUNTER — OFFICE VISIT (OUTPATIENT)
Dept: ORTHOPEDICS | Facility: CLINIC | Age: 48
End: 2023-11-03
Attending: PHYSICIAN ASSISTANT
Payer: COMMERCIAL

## 2023-11-03 VITALS — WEIGHT: 113 LBS | HEIGHT: 61 IN | BODY MASS INDEX: 21.34 KG/M2

## 2023-11-03 DIAGNOSIS — S60.221A CONTUSION OF RIGHT HAND, INITIAL ENCOUNTER: Primary | ICD-10-CM

## 2023-11-03 PROCEDURE — 99203 OFFICE O/P NEW LOW 30 MIN: CPT | Performed by: STUDENT IN AN ORGANIZED HEALTH CARE EDUCATION/TRAINING PROGRAM

## 2023-11-03 NOTE — PROGRESS NOTES
ASSESSMENT & PLAN    Mecca was seen today for follow up.    Diagnoses and all orders for this visit:    Contusion of finger of right hand, sequela          48-year-old female presenting with acute right hand injury and contusion follow-up.  Improving today with decreased tenderness and pain however still having tenderness that can affect her  strength and ability the left.  Strays previously negative and not repeated due to clinically improving status.  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  She is not at full strength to lift and use hand at this time due to pain however has improved and anticipate bruise will continue to heal over the next week.  Can lift as tolerated by pain.  Extensor and flexor mechanism intact less likely tendon injury.  Can follow-up in 1 week if pain not resolved.    Return sooner if develops new or worsening symptoms.    Options for treatment and/or follow-up care were reviewed with the patient was actively involved in the decision making process. Patient verbalized understanding and was in agreement with the plan.    >30 minutes spent on the date of the encounter doing chart review, history and exam, documentation and further activities as noted above with the exception of procedures.    Ebonisa Leyva [facility] SPORTS MEDICINE CLINIC [facility]    SUBJECTIVE- November 3, 2023    Chief Complaint   Patient presents with    Right Hand - Follow Up       Mecca Engle is a 48 year old female who is seen in f/u up for right hand. Since last visit on 11/3/2023 patient has it is  through the index finger and swelling has gone down.  - Now ~ 10/29. < 2 weeks from initial onset    Worsened by: occasionally some tenderness when touched   Better with: bracing  Treatments tried: rest/activity avoidance, ice, and casting/splinting/bracing  Associated symptoms:  no distal numbness or tingling; denies swelling or  "warmth    The patient is seen by themselves.  The patient is Right handed    Orthopedic/Surgical history: NO  Social History/Occupation:       PMH, Medications and Allergies were reviewed and updated as needed.    ROS:  As noted above otherwise negative.    Patient Active Problem List   Diagnosis    Headache    Umbilical hernia    Grand multiparity with  problem    High-risk pregnancy    Open wound of nasal septum    Hydronephrosis    Elderly multigravida with antepartum condition or complication    CARDIOVASCULAR SCREENING; LDL GOAL LESS THAN 160    Psychophysiological insomnia    Situational anxiety       No current outpatient medications on file.         OBJECTIVE:  Ht 1.549 m (5' 1\")   Wt 51.3 kg (113 lb)   BMI 21.35 kg/m     General: healthy, alert and in no distress  Skin: no suspicious lesions or rash.  CV: distal perfusion intact cap refill less than 2 seconds right hand  Resp: normal respiratory effort without conversational dyspnea   Psych: normal mood and affect  Gait: NORMAL  Neuro: Normal light sensory exam of right hand intact     RIGHT HAND  Inspection:  Improved bruising over first carpal  Palpation:   Carpals: normal   Metacarpals: 1st metacarpal   Thumb: None   Fingers: triggering fifth  Range of Motion:    Full active flexion and extension at MCP, PIP, and DIP joints; normal finger cascade without malrotation.  Wrist pronation, supination, and ulnar/radial deviation normal.Triggering of fifth phalanx without smooth coordination.  Strength:     full 5/5  Special Tests:    Positive: palpable triggering fifth phalanx    RADIOLOGY:  Final results and radiologist's interpretation, available in the [facility] health record.  No new images were taken today and previous images were reviewed with patient in office during previous visit.    Narrative & Impression   EXAM: XR HAND RIGHT G/E 3 VIEWS  LOCATION: [facility]  DATE: 10/29/2023   "   INDICATION: Right second metacarpal pain after an injury.  COMPARISON: None.                                                                      IMPRESSION: Normal joint spaces and alignment. No fracture.

## 2023-11-03 NOTE — PATIENT INSTRUCTIONS
1. Contusion of right hand        - Contusion of right hand. Xrays from initial ED visit reviewed and negative for fracture  - ICE, compression as to help with pain and swelling  -Tylenol 500-1000mg (up to three times per day) and ibuprofen 600mg (up to three times per day) as needed for pain and swelling. Always take ibuprofen with food.   - Difficulty using right hand and job does not have light duty so will submit paperwork. Will follow-up in one week and reassess. If not improved can consider      Please call [phone]  Ask for my team if you have any questions or concerns    Eboni Leyva DO  [facility]      Thank you for choosing [facility]    CLINIC LOCATIONS:     [place]  TRIAGE LINE: [phone] [address] [facility] APPOINTMENTS: [phone]   [place] [address] RADIOLOGY: [phone]   (Thursday & Friday) PHYSICAL THERAPY: [phone]    BILLING QUESTIONS: [phone]   [place] FAX: [phone] [id number]    [place] [address]    (Monday & Wednesday
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
## 2023-11-10 ENCOUNTER — OFFICE VISIT (OUTPATIENT)
Dept: ORTHOPEDICS | Facility: CLINIC | Age: 48
End: 2023-11-10
Payer: COMMERCIAL

## 2023-11-10 VITALS — WEIGHT: 113 LBS | HEIGHT: 61 IN | BODY MASS INDEX: 21.34 KG/M2

## 2023-11-10 DIAGNOSIS — S60.00XS: Primary | ICD-10-CM

## 2023-11-10 PROBLEM — S60.221A CONTUSION OF RIGHT HAND, INITIAL ENCOUNTER: Status: ACTIVE | Noted: 2023-11-10

## 2023-11-10 PROCEDURE — 99214 OFFICE O/P EST MOD 30 MIN: CPT | Performed by: STUDENT IN AN ORGANIZED HEALTH CARE EDUCATION/TRAINING PROGRAM

## 2023-11-10 NOTE — LETTER
11/10/2023         RE: Mecca Engle  [id number] [facility] [id number]        Dear Colleague,    Thank you for referring your patient, Mecca Engle, to the [facility] [place]. Please see a copy of my visit note below.    ASSESSMENT & PLAN    Mecca was seen today for follow up.    Diagnoses and all orders for this visit:    Contusion of finger of right hand, sequela          48-year-old female presenting with acute right hand injury and contusion follow-up.  Improving today with decreased tenderness and pain however still having tenderness that can affect her  strength and ability the left.  Strays previously negative and not repeated due to clinically improving status.  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  She is not at full strength to lift and use hand at this time due to pain however has improved and anticipate bruise will continue to heal over the next week.  Can lift as tolerated by pain.  Extensor and flexor mechanism intact less likely tendon injury.  Can follow-up in 1 week if pain not resolved.    Return sooner if develops new or worsening symptoms.    Options for treatment and/or follow-up care were reviewed with the patient was actively involved in the decision making process. Patient verbalized understanding and was in agreement with the plan.    >30 minutes spent on the date of the encounter doing chart review, history and exam, documentation and further activities as noted above with the exception of procedures.    Eboni Leyva DO  [facility]    SUBJECTIVE- November 3, 2023    Chief Complaint   Patient presents with     Right Hand - Follow Up       Mecca Engle is a 48 year old female who is seen in f/u up for right hand. Since last visit on 11/3/2023 patient has it is  through the index finger and swelling has gone down.  - Now ~  "10/29. < 2 weeks from initial onset    Worsened by: occasionally some tenderness when touched   Better with: bracing  Treatments tried: rest/activity avoidance, ice, and casting/splinting/bracing  Associated symptoms:  no distal numbness or tingling; denies swelling or warmth    The patient is seen by themselves.  The patient is Right handed    Orthopedic/Surgical history: NO  Social History/Occupation:       PMH, Medications and Allergies were reviewed and updated as needed.    ROS:  As noted above otherwise negative.    Patient Active Problem List   Diagnosis     Headache     Umbilical hernia     Grand multiparity with  problem     High-risk pregnancy     Open wound of nasal septum     Hydronephrosis     Elderly multigravida with antepartum condition or complication     CARDIOVASCULAR SCREENING; LDL GOAL LESS THAN 160     Psychophysiological insomnia     Situational anxiety       No current outpatient medications on file.         OBJECTIVE:  Ht 1.549 m (5' 1\")   Wt 51.3 kg (113 lb)   BMI 21.35 kg/m     General: healthy, alert and in no distress  Skin: no suspicious lesions or rash.  CV: distal perfusion intact cap refill less than 2 seconds right hand  Resp: normal respiratory effort without conversational dyspnea   Psych: normal mood and affect  Gait: NORMAL  Neuro: Normal light sensory exam of right hand intact     RIGHT HAND  Inspection:  Improved bruising over first carpal  Palpation:   Carpals: normal   Metacarpals: 1st metacarpal   Thumb: None   Fingers: triggering fifth  Range of Motion:    Full active flexion and extension at MCP, PIP, and DIP joints; normal finger cascade without malrotation.  Wrist pronation, supination, and ulnar/radial deviation normal.Triggering of fifth phalanx without smooth coordination.  Strength:     full 5/5  Special Tests:    Positive: palpable triggering fifth phalanx    RADIOLOGY:  Final results and radiologist's interpretation, available in " the [facility] health record.  No new images were taken today and previous images were reviewed with patient in office during previous visit.    Narrative & Impression   EXAM: XR HAND RIGHT G/E 3 VIEWS  LOCATION: [facility]  DATE: 10/29/2023     INDICATION: Right second metacarpal pain after an injury.  COMPARISON: None.                                                                      IMPRESSION: Normal joint spaces and alignment. No fracture.                  Again, thank you for allowing me to participate in the care of your patient.        Sincerely,        Eboni Leyva MD

## 2023-11-10 NOTE — PATIENT INSTRUCTIONS
1. Contusion of finger of right hand, sequela      - Still with contusion and tenderness  -Unable to lift heavy objects > 100lbs due to tenderness and pain for job and still unable to work  -Lift as tolerated until pain free  -Can follow-up as needed      Please call [phone]  Ask for my team if you have any questions or concerns    Eboni Leyva DO  [facility]      Thank you for choosing [facility]!    CLINIC LOCATIONS:     [place]  TRIAGE LINE: [phone] [address] [place] [address] APPOINTMENTS: [phone]   [place] [address] RADIOLOGY: [phone]   (Thursday & Friday) PHYSICAL THERAPY: [phone]    BILLING QUESTIONS: [phone]   [place] FAX: [phone] [address]    [place] [address]    (Monday & Wednesday

## 2023-12-05 ENCOUNTER — TELEPHONE (OUTPATIENT)
Dept: ORTHOPEDICS | Facility: CLINIC | Age: 48
End: 2023-12-05
Payer: COMMERCIAL

## 2023-12-05 NOTE — TELEPHONE ENCOUNTER
Reason for Call:  Form, our goal is to have forms completed with 7 days, however, some forms may require a visit or additional information.    Type of letter, form or note:  work comp     Who is the form from?:  work comp- [facility] ([facility])    Where did the form come from: form was mailed in    Phone number of person requesting form: Aditya Swartz, phone#: [phone]    Desired completion date of form: ASAP       How will form be returned?:  fax to [facility] at fax#: [phone]      Form was started and place in Provider Basket for provider review/ completion at [place].

## 2023-12-06 NOTE — TELEPHONE ENCOUNTER
Form completed and sent to desired location. Copy made and sent to scan.    Elias Parikh, Visit Facilitator

## 2023-12-22 ENCOUNTER — APPOINTMENT (OUTPATIENT)
Dept: CT IMAGING | Facility: CLINIC | Age: 48
End: 2023-12-22
Attending: EMERGENCY MEDICINE
Payer: COMMERCIAL

## 2023-12-22 ENCOUNTER — HOSPITAL ENCOUNTER (EMERGENCY)
Facility: CLINIC | Age: 48
Discharge: HOME | End: 2023-12-22
Attending: EMERGENCY MEDICINE | Admitting: EMERGENCY MEDICINE
Payer: COMMERCIAL

## 2023-12-22 VITALS
WEIGHT: 124.5 LBS | HEART RATE: 70 BPM | BODY MASS INDEX: 23.5 KG/M2 | RESPIRATION RATE: 13 BRPM | HEIGHT: 61 IN | DIASTOLIC BLOOD PRESSURE: 76 MMHG | OXYGEN SATURATION: 97 % | SYSTOLIC BLOOD PRESSURE: 111 MMHG

## 2023-12-22 DIAGNOSIS — R20.2 ARM PARESTHESIA, RIGHT: ICD-10-CM

## 2023-12-22 DIAGNOSIS — R42 DIZZINESS: ICD-10-CM

## 2023-12-22 DIAGNOSIS — M54.50 ACUTE BILATERAL LOW BACK PAIN, UNSPECIFIED WHETHER SCIATICA PRESENT: ICD-10-CM

## 2023-12-22 LAB
ANION GAP SERPL CALCULATED.3IONS-SCNC: 13 MMOL/L (ref 7–15)
APTT PPP: 26 SECONDS (ref 22–38)
BASOPHILS # BLD AUTO: 0 10E3/UL (ref 0–0.2)
BASOPHILS NFR BLD AUTO: 0 %
BUN SERPL-MCNC: 22.7 MG/DL (ref 6–20)
CALCIUM SERPL-MCNC: 9.1 MG/DL (ref 8.6–10)
CHLORIDE SERPL-SCNC: 103 MMOL/L (ref 98–107)
CREAT SERPL-MCNC: 0.84 MG/DL (ref 0.51–0.95)
DEPRECATED HCO3 PLAS-SCNC: 22 MMOL/L (ref 22–29)
EGFRCR SERPLBLD CKD-EPI 2021: 85 ML/MIN/1.73M2
EOSINOPHIL # BLD AUTO: 0.1 10E3/UL (ref 0–0.7)
EOSINOPHIL NFR BLD AUTO: 1 %
ERYTHROCYTE [DISTWIDTH] IN BLOOD BY AUTOMATED COUNT: 12 % (ref 10–15)
GLUCOSE BLDC GLUCOMTR-MCNC: 120 MG/DL (ref 70–99)
GLUCOSE SERPL-MCNC: 133 MG/DL (ref 70–99)
HCT VFR BLD AUTO: 40.8 % (ref 35–47)
HGB BLD-MCNC: 13.5 G/DL (ref 11.7–15.7)
IMM GRANULOCYTES # BLD: 0.1 10E3/UL
IMM GRANULOCYTES NFR BLD: 1 %
INR PPP: 1.02 (ref 0.85–1.15)
LYMPHOCYTES # BLD AUTO: 1.5 10E3/UL (ref 0.8–5.3)
LYMPHOCYTES NFR BLD AUTO: 15 %
MCH RBC QN AUTO: 30.8 PG (ref 26.5–33)
MCHC RBC AUTO-ENTMCNC: 33.1 G/DL (ref 31.5–36.5)
MCV RBC AUTO: 93 FL (ref 78–100)
MONOCYTES # BLD AUTO: 0.3 10E3/UL (ref 0–1.3)
MONOCYTES NFR BLD AUTO: 4 %
NEUTROPHILS # BLD AUTO: 7.5 10E3/UL (ref 1.6–8.3)
NEUTROPHILS NFR BLD AUTO: 79 %
NRBC # BLD AUTO: 0 10E3/UL
NRBC BLD AUTO-RTO: 0 /100
PLATELET # BLD AUTO: 286 10E3/UL (ref 150–450)
POTASSIUM SERPL-SCNC: 3.2 MMOL/L (ref 3.4–5.3)
RBC # BLD AUTO: 4.39 10E6/UL (ref 3.8–5.2)
SODIUM SERPL-SCNC: 138 MMOL/L (ref 135–145)
TROPONIN T SERPL HS-MCNC: <6 NG/L
WBC # BLD AUTO: 9.6 10E3/UL (ref 4–11)

## 2023-12-22 PROCEDURE — 84484 ASSAY OF TROPONIN QUANT: CPT | Performed by: EMERGENCY MEDICINE

## 2023-12-22 PROCEDURE — 250N000009 HC RX 250: Performed by: EMERGENCY MEDICINE

## 2023-12-22 PROCEDURE — 70496 CT ANGIOGRAPHY HEAD: CPT

## 2023-12-22 PROCEDURE — 85610 PROTHROMBIN TIME: CPT | Performed by: EMERGENCY MEDICINE

## 2023-12-22 PROCEDURE — 85025 COMPLETE CBC W/AUTO DIFF WBC: CPT | Performed by: EMERGENCY MEDICINE

## 2023-12-22 PROCEDURE — 85730 THROMBOPLASTIN TIME PARTIAL: CPT | Performed by: EMERGENCY MEDICINE

## 2023-12-22 PROCEDURE — 82962 GLUCOSE BLOOD TEST: CPT

## 2023-12-22 PROCEDURE — 36415 COLL VENOUS BLD VENIPUNCTURE: CPT | Performed by: EMERGENCY MEDICINE

## 2023-12-22 PROCEDURE — 99285 EMERGENCY DEPT VISIT HI MDM: CPT | Mod: 25 | Performed by: EMERGENCY MEDICINE

## 2023-12-22 PROCEDURE — 93005 ELECTROCARDIOGRAM TRACING: CPT | Performed by: EMERGENCY MEDICINE

## 2023-12-22 PROCEDURE — 99207 PR NO BILLABLE SERVICE THIS VISIT: CPT | Performed by: NURSE PRACTITIONER

## 2023-12-22 PROCEDURE — 70450 CT HEAD/BRAIN W/O DYE: CPT

## 2023-12-22 PROCEDURE — 80048 BASIC METABOLIC PNL TOTAL CA: CPT | Performed by: EMERGENCY MEDICINE

## 2023-12-22 PROCEDURE — 250N000011 HC RX IP 250 OP 636: Performed by: EMERGENCY MEDICINE

## 2023-12-22 PROCEDURE — 93010 ELECTROCARDIOGRAM REPORT: CPT | Performed by: EMERGENCY MEDICINE

## 2023-12-22 PROCEDURE — 70498 CT ANGIOGRAPHY NECK: CPT

## 2023-12-22 RX ORDER — IOPAMIDOL 755 MG/ML
500 INJECTION, SOLUTION INTRAVASCULAR ONCE
Status: COMPLETED | OUTPATIENT
Start: 2023-12-22 | End: 2023-12-22

## 2023-12-22 RX ADMIN — IOPAMIDOL 67 ML: 755 INJECTION, SOLUTION INTRAVENOUS at 09:34

## 2023-12-22 RX ADMIN — SODIUM CHLORIDE 100 ML: 9 INJECTION, SOLUTION INTRAVENOUS at 09:34

## 2023-12-22 ASSESSMENT — ACTIVITIES OF DAILY LIVING (ADL)
ADLS_ACUITY_SCORE: 35
ADLS_ACUITY_SCORE: 35

## 2023-12-22 NOTE — DISCHARGE INSTRUCTIONS
Restart Medrol Dosepak.  If anxiety becomes severe or if you have any hallucinations you must stop    Referrals been placed to primary care clinic.  Supporting a primary care provider because if your symptoms do not improve they need to consider to get an MRI of your brain, MRI of your cervical and lumbar spine.    Over-the-counter you can purchase meclizine.  This might help with your dizziness.  Recommend starting at 12.5 mg dose 3 times daily.  Can cause sedation/drowsiness.    Work note provided to be off work through next Tuesday.

## 2023-12-22 NOTE — LETTER
December 22, 2023      To Whom It May Concern:      Mecca Engle was seen in our Emergency Department today, 12/22/23.   Please excuse patient from employment through next Tuesday.  In the emergency department for evaluation of dizziness associated with right arm and leg symptoms warranting a stroke workup.    Sincerely,      Saeed Oneill Dr. [facility] ED Staff Physician

## 2023-12-22 NOTE — CONSULTS
"  [facility]    Stroke Telephone Note    I was called by Saeed Oneill on 12/22/23 regarding patient Mecca Engle. The patient is a 48 year old female with no significant past medical history. She presented with 10 days of back pain (no radicular sx but has had RLE heaviness) and onset of RUE and RLE tingling at 0700. There are also vague complaints of dizziness/lightheadedness that she cannot further clarify to ED. Mild headache. NIHSS 0. She feels R hand  is weaker than usual, but no objective weakness per ED.    Vitals  BP: (!) 145/88   Pulse: 104   Resp: 18       Weight: 56.5 kg (124 lb 8 oz)    Stroke Code Data (for stroke code without tele)  Stroke code activated 12/22/23  0904   Stroke provider first response 12/22/23  0906   Last known normal 12/22/23  0650      Time of discovery (or onset of symptoms) 12/22/23  0700   Head CT read by Stroke Neuro Provider 12/22/23  0916   Was stroke code de-escalated? Yes  12/22/23  0930     Imaging Findings  CT head: negative for acute pathology  CTA head/neck: No significant stenosis or LVO    Intravenous Thrombolysis  Not given due to:   - minor/isolated/quickly resolving symptoms    Endovascular Treatment  Not initiated due to absence of proximal vessel occlusion    Impression  Right arm and leg paraesthesias without focal neurologic deficits. Low suspicion for focal brain ischemia.     Recommendations   - MRI brain w/wo  - MRI Cspine    Case discussed with vascular neurology attending Dr. Rose.    My recommendations are based on the information provided over the phone by Mecca Engle's in-person providers. They are not intended to replace the clinical judgment of her in-person providers. I was not requested to personally see or examine the patient at this time.     Angelica Steele, CNP  Vascular Neurology    To page me or covering stroke neurology team member, click here: AMCOM  Choose \"On Call\" tab at top, then select " "\"NEUROLOGY/ALL SITES\" from middle drop-down box, press Enter, then look for \"stroke\" or \"telestroke\" for your site.    "

## 2023-12-22 NOTE — ED PROVIDER NOTES
History     Chief Complaint   Patient presents with    Stroke Symptoms     HPI    Mecca Engle is a 48 year old female significant past medical history for situational anxiety and low back pain.  10 days ago she started developing low back discomfort.  Or mechanical.  Chiropractic treatment x 4 and 1 visit to an orthopedic urgent care clinic felt it was all myofascial symptoms without much radicular component    This morning at 7 AM she noted abrupt onset of dizziness.  Has a hard time describing it as to being vertiginous or nonvertiginous.  Is affecting her balance.  At that same time she noted some paresthesias that she describes as tingling in her right arm and some weakness to her right hand grasp.  She also noted some of the similar paresthesias in her right leg.    No cardiac history.  No stroke history.  No hyperlipidemia  Non-smoker  No DM  No hypertension    Allergies:  Allergies   Allergen Reactions    Penicillins        Problem List:    Patient Active Problem List    Diagnosis Date Noted    Contusion of right hand, initial encounter 11/10/2023     Priority: Medium    Psychophysiological insomnia 2019     Priority: Medium    Situational anxiety 2019     Priority: Medium    CARDIOVASCULAR SCREENING; LDL GOAL LESS THAN 160 10/31/2010     Priority: Medium    Elderly multigravida with antepartum condition or complication 06/10/2009     Priority: Medium    Hydronephrosis 2007     Priority: Medium    Open wound of nasal septum 2006     Priority: Medium     Problem list name updated by automated process. Provider to review      Grand multiparity with  problem 10/07/2005     Priority: Medium     Problem list name updated by automated process. Provider to review      High-risk pregnancy 10/07/2005     Priority: Medium     Problem list name updated by automated process. Provider to review      Umbilical hernia 2004     Priority: Medium     Problem list name updated by  automated process. Provider to review      Headache 2004     Priority: Medium     Problem list name updated by automated process. Provider to review          Past Medical History:    Past Medical History:   Diagnosis Date    Abnormal Papanicolaou smear of cervix and cervical HPV     Supervision of high-risk pregnancy with grand multiparity        Past Surgical History:    Past Surgical History:   Procedure Laterality Date    DILATION AND CURETTAGE SUCTION N/A 2015    Procedure: DILATION AND CURETTAGE SUCTION;  Surgeon: Cooper Moraes MD;  Location: [facility] OR    HC COLP CERVIX/UPPER VAGINA  99    HC DILATION/CURETTAGE DIAG/THER NON OB      D & C    HC REMOVAL OF TONSILS,<11 Y/O      Tonsils <11y.o.       Family History:    Family History   Problem Relation Age of Onset    Circulatory Mother         anemia    Arthritis Mother     Gastrointestinal Disease Mother         crohns disease at age 53    Cancer Paternal Grandfather         lymph    Hypertension Maternal Aunt     Hypertension Maternal Grandfather     Cerebrovascular Disease Maternal Grandfather          age 54    Thyroid Disease Maternal Aunt     Obesity Maternal Aunt     Diabetes Maternal Aunt     Cancer - colorectal Maternal Uncle         diagnosed at age 58-living    Respiratory Other         pt son has asthma       Social History:  Marital Status:   [2]  Social History     Tobacco Use    Smoking status: Never    Smokeless tobacco: Never   Substance Use Topics    Alcohol use: No    Drug use: No        Medications:    No current outpatient medications on file.        Review of Systems   All other systems reviewed and are negative.      Physical Exam   BP: (!) 145/88  Pulse: 104  Resp: 18  Weight: 56.5 kg (124 lb 8 oz)  SpO2: 100 %      Physical Exam  Vitals and nursing note reviewed. Exam conducted with a chaperone present.   Constitutional:       General: She is not in acute distress.     Appearance: Normal appearance. She is  not diaphoretic.   HENT:      Head: Atraumatic.      Nose: Nose normal.      Mouth/Throat:      Mouth: Mucous membranes are moist.   Eyes:      General: No scleral icterus.     Conjunctiva/sclera: Conjunctivae normal.   Cardiovascular:      Rate and Rhythm: Normal rate and regular rhythm.      Heart sounds: Normal heart sounds. No murmur heard.  Pulmonary:      Effort: No respiratory distress.      Breath sounds: Normal breath sounds.   Abdominal:      General: Abdomen is flat. There is no distension.      Palpations: Abdomen is soft.      Tenderness: There is no abdominal tenderness.   Musculoskeletal:      Cervical back: Neck supple.   Skin:     General: Skin is warm.      Findings: No rash.   Neurological:      General: No focal deficit present.      Mental Status: She is alert and oriented to person, place, and time.      Cranial Nerves: No cranial nerve deficit.      Sensory: No sensory deficit.      Motor: No weakness.      Comments: National Institutes of Health Stroke Scale  Exam Interval: Baseline   Score   Level of consciousness: (0)   Alert, keenly responsive   LOC questions: (0)   Answers both questions correctly   LOC commands: (0)   Performs both tasks correctly   Best gaze: (0)   Normal   Visual: (0)   No visual loss   Facial palsy: (0)   Normal symmetrical movements   Motor arm (left): (0)   No drift   Motor arm (right): (0)   No drift   Motor leg (left): (0)   No drift   Motor leg (right): (0)   No drift   Limb ataxia: (0)   Absent   Sensory: (0)   Normal- no sensory loss   Best language: (0)   Normal- no aphasia   Dysarthria: (0)   Normal   Extinction and inattention: (0)   No abnormality      Total Score:  0             ED Course                 Procedures         EKG:  Interpretation by Saeed Oneill DO.   Indication: Stroke evaluation  Normal sinus rhythm.  Rate 80 bpm.  No ectopy  Normal axis    Impressions-normal EKG      Critical Care time: 30 minutes one-to-one for code stroke tier  1  Results for orders placed or performed during the hospital encounter of 12/22/23   CT Head w/o Contrast     Status: None    Narrative    CT OF THE HEAD WITHOUT CONTRAST 12/22/2023 9:10 AM     COMPARISON: None    HISTORY: Altered mental status. Clinical concern for acute infarct.    TECHNIQUE: Axial CT images of the head from the skull base to the  vertex were acquired without IV contrast.    FINDINGS: The ventricles and basal cisterns are within normal limits  in configuration. There is no midline shift. There are no extra-axial  fluid collections.  Gray-white differentiation is well maintained.    No intracranial hemorrhage, mass or recent infarct.    The visualized paranasal sinuses are well-aerated. There is no  mastoiditis. There are no fractures of the visualized bones.      Impression    IMPRESSION: Normal head CT.      Radiation dose for this scan was reduced using automated exposure  control, adjustment of the mA and/or kV according to patient size, or  iterative reconstruction technique    TOOTIE WOOTEN MD         SYSTEM ID:  [id number]   CTA Head Neck with Contrast     Status: None    Narrative    CT ANGIOGRAM OF THE HEAD AND NECK WITHOUT AND WITH CONTRAST   12/22/2023 9:35 AM     COMPARISON: None    HISTORY: Altered mental status.    TECHNIQUE:  Precontrast localizing scans were followed by CT  angiography with an injection of ISOVUE-370, 67 mL nonionic  intravenous contrast material with scans through the head and neck.   Images were transferred to a separate 3-D workstation where  multiplanar reformations and 3-D images were created.  Estimates of  carotid stenoses are made relative to the distal internal carotid  artery diameters except as noted.      FINDINGS:   Neck CTA: The bilateral common carotid, bilateral cervical internal  carotid and bilateral vertebral arteries are patent without stenosis.     Head CTA: The basilar, bilateral intracranial distal internal carotid,  bilateral anterior  cerebral, bilateral middle cerebral and bilateral  posterior cerebral arteries are patent and unremarkable.      Impression    IMPRESSION: Normal neck and head CTA.      Radiation dose for this scan was reduced using automated exposure  control, adjustment of the mA and/or kV according to patient size, or  iterative reconstruction technique    TOOTIE WOOTEN MD         SYSTEM ID:  [id number]   Glucose by meter     Status: Abnormal   Result Value Ref Range    GLUCOSE BY METER POCT 120 (H) 70 - 99 mg/dL   Basic metabolic panel     Status: Abnormal   Result Value Ref Range    Sodium 138 135 - 145 mmol/L    Potassium 3.2 (L) 3.4 - 5.3 mmol/L    Chloride 103 98 - 107 mmol/L    Carbon Dioxide (CO2) 22 22 - 29 mmol/L    Anion Gap 13 7 - 15 mmol/L    Urea Nitrogen 22.7 (H) 6.0 - 20.0 mg/dL    Creatinine 0.84 0.51 - 0.95 mg/dL    GFR Estimate 85 >60 mL/min/1.73m2    Calcium 9.1 8.6 - 10.0 mg/dL    Glucose 133 (H) 70 - 99 mg/dL   INR     Status: Normal   Result Value Ref Range    INR 1.02 0.85 - 1.15   Partial thromboplastin time     Status: Normal   Result Value Ref Range    aPTT 26 22 - 38 Seconds   Troponin T, High Sensitivity     Status: Normal   Result Value Ref Range    Troponin T, High Sensitivity <6 <=14 ng/L   CBC with platelets and differential     Status: None   Result Value Ref Range    WBC Count 9.6 4.0 - 11.0 10e3/uL    RBC Count 4.39 3.80 - 5.20 10e6/uL    Hemoglobin 13.5 11.7 - 15.7 g/dL    Hematocrit 40.8 35.0 - 47.0 %    MCV 93 78 - 100 fL    MCH 30.8 26.5 - 33.0 pg    MCHC 33.1 31.5 - 36.5 g/dL    RDW 12.0 10.0 - 15.0 %    Platelet Count 286 150 - 450 10e3/uL    % Neutrophils 79 %    % Lymphocytes 15 %    % Monocytes 4 %    % Eosinophils 1 %    % Basophils 0 %    % Immature Granulocytes 1 %    NRBCs per 100 WBC 0 <1 /100    Absolute Neutrophils 7.5 1.6 - 8.3 10e3/uL    Absolute Lymphocytes 1.5 0.8 - 5.3 10e3/uL    Absolute Monocytes 0.3 0.0 - 1.3 10e3/uL    Absolute Eosinophils 0.1 0.0 - 0.7 10e3/uL     Absolute Basophils 0.0 0.0 - 0.2 10e3/uL    Absolute Immature Granulocytes 0.1 <=0.4 10e3/uL    Absolute NRBCs 0.0 10e3/uL   CBC with Platelets & Differential     Status: None    Narrative    The following orders were created for panel order CBC with Platelets & Differential.  Procedure                               Abnormality         Status                     ---------                               -----------         ------                     CBC with platelets and d...[[id number]]                      Final result                 Please view results for these tests on the individual orders.            Results for orders placed or performed during the hospital encounter of 12/22/23 (from the past 24 hour(s))   Glucose by meter   Result Value Ref Range    GLUCOSE BY METER POCT 120 (H) 70 - 99 mg/dL       Medications - No data to display    Assessments & Plan (with Medical Decision Making)  48-year-old female.  Presenting with complaint of low back pain, mild headache, dizziness, and some subtle sensory changes in the right arm and leg sensation of a slight loss of  strength on the right.  Symptoms presented at 7 AM.  She been dealing with low back pain for which she is seated for chiropractic treatments and went to an orthopedic urgent care clinic.  They placed her on a Medrol Dosepak.  Day 1 of Medrol caused to be anxious so she stopped.  No fever  No trauma  Presented within a window of tier 1 code stroke.  Her NIH SS = 0.  Spoke with the stroke neurology recommended that we proceed with imaging.  CT and CTA imaging was all normal.  Did not proceed with MR imaging due to no acute ability to do today and the probability was felt to be low that she would have a small stroke within the basal ganglia area.  Did talk to Anatoliy Em the neuroradiologist.  Patient I discussed MR imaging and she will consider at a later date if her dizziness continues.  The other concern with her right arm complaints and low back  pain and right leg complaints is that this could be spinal based/disc pathology based and if her symptoms were to escalate consider MRI of the cervical and lumbar region.  Patient does not have a primary care provider so I will refer her to the primary care clinic for follow-up next week.  Advised that she give a trial of meclizine if her dizziness continues she can see a ENT at the [facility] clinic at [facility].  She works as a  in the halfway and I gave her a work release note through next Wednesday.     I have reviewed the nursing notes.    I have reviewed the findings, diagnosis, plan and need for follow up with the patient.          New Prescriptions    No medications on file       Final diagnoses:   Dizziness   Acute bilateral low back pain, unspecified whether sciatica present   Arm paresthesia, right       12/22/2023   [facility] EMERGENCY DEPT       Saeed Oneill, DO  12/22/23 2979

## 2023-12-22 NOTE — ED TRIAGE NOTES
PT comes in w/ R sided weakness, and dizziness that started at 0700 this morning. BG was 120.

## 2023-12-26 ENCOUNTER — NURSE TRIAGE (OUTPATIENT)
Dept: FAMILY MEDICINE | Facility: CLINIC | Age: 48
End: 2023-12-26
Payer: COMMERCIAL

## 2023-12-26 NOTE — TELEPHONE ENCOUNTER
Patient states she has a pinched nerve on her right side. She has had this pain for 1 1/2 weeks. Worse since Friday.  She has pain going down her hip to her foot.    She feels like her foot is falling asleep  Pain in her right side of back.  Pain level today 5 out of 10.  She has some pain shooting into her groin.  No weakness in her leg.    Per protocol patient advised to be seen. Appointment made.    Myesha Croft RN on 12/26/2023 at 8:24 AM      Reason for Disposition   Back pain radiating (shooting) into hip   Pain radiates into the thigh or further down the leg    Additional Information   Negative: Looks like a broken bone or dislocated joint (e.g., crooked or deformed)   Negative: Sounds like a life-threatening emergency to the triager   Negative: Passed out (i.e., fainted, collapsed and was not responding)   Negative: Shock suspected (e.g., cold/pale/clammy skin, too weak to stand, low BP, rapid pulse)   Negative: Sounds like a life-threatening emergency to the triager   Negative: Major injury to the back (e.g., MVA, fall > 10 feet or 3 meters, penetrating injury, etc.)   Negative: Pain in the upper back over the ribs (rib cage) that radiates (travels) into the chest   Negative: Pain in the upper back over the ribs (rib cage) and worsened by coughing (or clearly increases with breathing)   Negative: Back pain during pregnancy   Negative: SEVERE back pain of sudden onset and age > 60 years   Negative: SEVERE abdominal pain (e.g., excruciating)   Negative: Abdominal pain and age > 60 years   Negative: Unable to urinate (or only a few drops) and bladder feels very full   Negative: Loss of bladder or bowel control (urine or bowel incontinence; wetting self, leaking stool) of new-onset   Negative: Numbness (loss of sensation) in groin or rectal area   Negative: Pain radiates into groin, scrotum   Negative: Blood in urine (red, pink, or tea-colored)   Negative: Vomiting and pain over lower ribs of back (i.e.,  flank - kidney area)   Negative: Weakness of a leg or foot (e.g., unable to bear weight, dragging foot)   Negative: Patient sounds very sick or weak to the triager   Negative: Fever > 100.4 F (38.0 C) and flank pain   Negative: Pain or burning with passing urine (urination)   Negative: SEVERE back pain (e.g., excruciating, unable to do any normal activities) and not improved after pain medicine and CARE ADVICE   Negative: Numbness in an arm or hand (i.e., loss of sensation) and upper back pain   Negative: Numbness in a leg or foot (i.e., loss of sensation)   Negative: High-risk adult (e.g., history of cancer, history of HIV, or history of IV Drug Use)   Negative: Soft tissue infection (e.g., abscess, cellulitis) or other serious infection (e.g., bacteremia) in last 2 weeks   Negative: Painful rash with multiple small blisters grouped together (i.e., dermatomal distribution or 'band' or 'stripe')   Negative: Pain radiates into the thigh or further down the leg, and in both legs   Negative: Age > 50 and no history of prior similar back pain   Negative: MODERATE back pain (e.g., interferes with normal activities) and present > 3 days    Protocols used: Hip Pain-A-OH, Back Pain-A-OH

## 2023-12-27 ENCOUNTER — OFFICE VISIT (OUTPATIENT)
Dept: FAMILY MEDICINE | Facility: CLINIC | Age: 48
End: 2023-12-27
Payer: COMMERCIAL

## 2023-12-27 VITALS
TEMPERATURE: 97.8 F | WEIGHT: 115 LBS | BODY MASS INDEX: 22.58 KG/M2 | HEIGHT: 60 IN | HEART RATE: 80 BPM | DIASTOLIC BLOOD PRESSURE: 68 MMHG | OXYGEN SATURATION: 98 % | RESPIRATION RATE: 18 BRPM | SYSTOLIC BLOOD PRESSURE: 110 MMHG

## 2023-12-27 DIAGNOSIS — G57.01 PIRIFORMIS SYNDROME, RIGHT: Primary | ICD-10-CM

## 2023-12-27 DIAGNOSIS — F41.9 ANXIETY: ICD-10-CM

## 2023-12-27 PROBLEM — T85.49XA BREAST IMPLANT DEFLATION: Status: ACTIVE | Noted: 2023-05-19

## 2023-12-27 PROCEDURE — 99214 OFFICE O/P EST MOD 30 MIN: CPT | Performed by: NURSE PRACTITIONER

## 2023-12-27 RX ORDER — NAPROXEN 500 MG/1
500 TABLET ORAL 2 TIMES DAILY WITH MEALS
Qty: 40 TABLET | Refills: 0 | Status: SHIPPED | OUTPATIENT
Start: 2023-12-27 | End: 2024-01-16

## 2023-12-27 RX ORDER — BUSPIRONE HYDROCHLORIDE 5 MG/1
5 TABLET ORAL 2 TIMES DAILY
Qty: 180 TABLET | Refills: 0 | Status: SHIPPED | OUTPATIENT
Start: 2023-12-27 | End: 2024-01-29

## 2023-12-27 RX ORDER — CYCLOBENZAPRINE HCL 5 MG
5 TABLET ORAL 3 TIMES DAILY PRN
Qty: 30 TABLET | Refills: 0 | Status: SHIPPED | OUTPATIENT
Start: 2023-12-27 | End: 2024-02-09

## 2023-12-27 ASSESSMENT — PAIN SCALES - GENERAL: PAINLEVEL: SEVERE PAIN (6)

## 2023-12-27 NOTE — PROGRESS NOTES
Assessment & Plan     Piriformis syndrome, right  Discussed Piriformis syndrome verses sciatica in detail today. Given she did not have any improvement with the steroid pack and her tenderness/spasm into the buttock, I suspect her symptoms are likely more related to piriformis syndrome than the sciatica. We discussed use of muscle relaxants and NSAIDs as below. Use and side effects reviewed. Stretching, use of ice or heat application and range of motion exercises encouraged. Topical analgesics such as BioFreeze were also encouraged. Physical therapy consult placed. She was provided with a work release for the next week, she will contact the clinic if she is not able to return to work in one week, sooner with any new or worsening symptoms, questions or concerns.     - cyclobenzaprine (FLEXERIL) 5 MG tablet; Take 1 tablet (5 mg) by mouth 3 times daily as needed for muscle spasms  - naproxen (NAPROSYN) 500 MG tablet; Take 1 tablet (500 mg) by mouth 2 times daily (with meals) for 20 days  - Physical Therapy Referral; Future    Anxiety  Discussed use of daily medication verses as needed medications. Given her job and concern of side effects, I do not feel as needed medications are going to be the best option for her. We discussed daily medication options, she has concerns about mood changes and weight gain. Use of Buspirone as an option for her anxiety was also discussed, she is open to trying this. Side effects reviewed. I would like her to follow-up in 6-8 weeks with a new primary care provider or sooner with any new or worsening symptoms, questions or concerns.     - busPIRone (BUSPAR) 5 MG tablet; Take 1 tablet (5 mg) by mouth 2 times daily for 90 days    Encouraged regular exercise, a healthy diet, and behavioral modifications. Patient instructed to contact medical provider if mood disturbance intensifies. Emergency resources were reviewed. Patient instructed to call 911 or Emergency Room immediately if thoughts  of self harm, thoughts of harming others, or they are unable to care for themselves or others.     I explained my diagnostic considerations and recommendations to the patient, who voiced understanding and agreement with the assessment and treatment plan. All questions were answered to patient's apparent satisfaction. We discussed potential side effects of any prescribed or recommended therapies, as well as expectations for response to treatments and importance of lifestyle measures that may improve symptoms. Patient was advised to contact our office if there are new symptoms or no improvement or worsening of conditions or symptoms.    Greater than 30 minutes were spent today with more than 50% dedicated to counseling and coordination of care of the above mentioned problems.                   Kelle Faria NP  [facility]    Diane Wing is a 48 year old, presenting for the following health issues:  No chief complaint on file.      12/27/2023     1:54 PM   Additional Questions   Roomed by Rain SANCHEZ         12/27/2023     1:54 PM   Patient Reported Additional Medications   Patient reports taking the following new medications magnesium, vitamin c and vitamin d       History of Present Illness       Back Pain:  She presents for follow up of back pain. Patient's back pain is a new problem.    Original cause of back pain: not sure  First noticed back pain: 1-4 weeks ago  Patient feels back pain: constantlyLocation of back pain:  Right hip and right side of waist  Description of back pain: sharp and shooting  Back pain spreads: right buttocks    Since patient first noticed back pain, pain is: gradually worsening  Does back pain interfere with her job:  Yes  On a scale of 1-10 (10 being the worst), patient describes pain as:  6  What makes back pain worse: bending, coughing and twisting   Acupuncture: not tried  Acetaminophen: not helpful  Activity or exercise: not helpful  Chiropractor:  Not  "helpful  Cold: not helpful  Heat: helpful  Massage: not helpful  Muscle relaxants: not tried  NSAIDS: not helpful  Opioids: not helpful  Physical Therapy: not tried  Rest: helpful  Steroid Injection: not tried  Stretching: not helpful  Surgery: not tried  TENS unit: not tried  Topical pain relievers: helpful  Other healthcare providers patient is seeing for back pain: Other    She eats 2-3 servings of fruits and vegetables daily.She consumes 0 sweetened beverage(s) daily.She exercises with enough effort to increase her heart rate 30 to 60 minutes per day.  She exercises with enough effort to increase her heart rate 6 days per week.   She is taking medications regularly.     Mecca presents today for follow-up of low back pain radiating to her hips and down her legs. She was seen in the ED on 12/22/23. She was seen prior to this a [facility] and started on a Medrol dose pack which caused her to be anxious so she stopped it. Prior to this she was seeing a chiropractor starting about 2 weeks ago. She was recommended to restart this when seen in the ED. She did restart this but notes this caused her to have increased heart rate and anxiety. She did not feel this was overly helpful. The pain is constant, she reports feeling as though her foot is \"asleep\". She has not noticed anything specifically that worsens her symptoms, moving from a sitting to standing position will worsen the pain. The pain is more prevalent in the right low back, radiating down her right leg. The pain does radiate to the left side of her back as well. She denies any saddle numbness, bowel or bladder incontinence. She denies any injury or trauma. She did have an x-ray completed at [facility] which showed arthritis, no additional imaging was performed. She has been applying heat, and stretching at home. She is no longer taking ibuprofen, she had been previously however is concerned this could damage her stomach. She has missed work the past several days due to her " back pain as well. She reports no history of previous back injuries.     Mecca also has concerns of increased anxiety. She reports she has had anxiety in the past, however has been able to manage without any medication or specific therapies. She reports she has overall been feeling more anxious with panic attacks at times. She was recently seen in the ED for concerns of stroke which was felt to possibly be related to anxiety. She is interested in starting an as needed medication for this but is concerned with side effects. She works in a [employer] and needs to be alert and attentive at all times.    Patient Active Problem List   Diagnosis    Headache    Umbilical hernia    Grand multiparity with  problem    High-risk pregnancy    Open wound of nasal septum    Hydronephrosis    Elderly multigravida with antepartum condition or complication    CARDIOVASCULAR SCREENING; LDL GOAL LESS THAN 160    Psychophysiological insomnia    Situational anxiety    Contusion of right hand, initial encounter     No current outpatient medications on file.     No current facility-administered medications for this visit.       Review of Systems   Constitutional, HEENT, cardiovascular, pulmonary, gi and gu systems are negative, except as otherwise noted.      Objective    /68 (BP Location: Left arm, Patient Position: Chair)   Pulse 80   Temp 97.8  F (36.6  C) (Temporal)   Resp 18   Ht 1.524 m (5')   Wt 52.2 kg (115 lb)   LMP  (LMP Unknown)   SpO2 98%   BMI 22.46 kg/m    Body mass index is 22.46 kg/m .  Physical Exam  Vitals reviewed.   Constitutional:       General: She is not in acute distress.     Appearance: Normal appearance.   Pulmonary:      Effort: Pulmonary effort is normal.   Musculoskeletal:      Lumbar back: Spasms and tenderness present. No swelling, edema, deformity or bony tenderness. Decreased range of motion. Negative right straight leg raise test and negative left straight leg raise test.      Comments:  Palpable spasm in right buttock, extending to right hip. Deep palpation of spasm reproducing pain into the right leg. Bilateral leg strength 5/5. CMS intact.    Skin:     General: Skin is warm and dry.   Neurological:      Mental Status: She is alert and oriented to person, place, and time. Mental status is at baseline.      Motor: No weakness.   Psychiatric:         Mood and Affect: Mood normal.         Behavior: Behavior normal.      Comments: Anxious mood and affect            Admission on 12/22/2023, Discharged on 12/22/2023   Component Date Value Ref Range Status    GLUCOSE BY METER POCT 12/22/2023 120 (H)  70 - 99 mg/dL Final    Sodium 12/22/2023 138  135 - 145 mmol/L Final    Reference intervals for this test were updated on 09/26/2023 to more accurately reflect our healthy population. There may be differences in the flagging of prior results with similar values performed with this method. Interpretation of those prior results can be made in the context of the updated reference intervals.     Potassium 12/22/2023 3.2 (L)  3.4 - 5.3 mmol/L Final    Chloride 12/22/2023 103  98 - 107 mmol/L Final    Carbon Dioxide (CO2) 12/22/2023 22  22 - 29 mmol/L Final    Anion Gap 12/22/2023 13  7 - 15 mmol/L Final    Urea Nitrogen 12/22/2023 22.7 (H)  6.0 - 20.0 mg/dL Final    Creatinine 12/22/2023 0.84  0.51 - 0.95 mg/dL Final    GFR Estimate 12/22/2023 85  >60 mL/min/1.73m2 Final    Calcium 12/22/2023 9.1  8.6 - 10.0 mg/dL Final    Glucose 12/22/2023 133 (H)  70 - 99 mg/dL Final    INR 12/22/2023 1.02  0.85 - 1.15 Final    aPTT 12/22/2023 26  22 - 38 Seconds Final    Troponin T, High Sensitivity 12/22/2023 <6  <=14 ng/L Final    Either a High Sensitivity Troponin T baseline (0 hours) value = 100 ng/L, or an increase in High Sensitivity Troponin T = 7 ng/L at 2 hours compared to 0 hours (2-0 hours), suggests myocardial injury, and urgent clinical attention is required.    If the 2-0 hours increase is <7 ng/L, a High  Sensitivity Troponin T result above gender-specific reference ranges warrants further evaluation.   Recommendations for further evaluation include correlation with clinical decision-making tool (e.g., HEART), a 3rd High Sensitivity Troponin T test 2 hours after the 2nd (a 20% change from baseline would represent concern), admission for observation, close PCC/cardiology follow-up, or urgent outpatient provocative testing.    WBC Count 12/22/2023 9.6  4.0 - 11.0 10e3/uL Final    RBC Count 12/22/2023 4.39  3.80 - 5.20 10e6/uL Final    Hemoglobin 12/22/2023 13.5  11.7 - 15.7 g/dL Final    Hematocrit 12/22/2023 40.8  35.0 - 47.0 % Final    MCV 12/22/2023 93  78 - 100 fL Final    MCH 12/22/2023 30.8  26.5 - 33.0 pg Final    MCHC 12/22/2023 33.1  31.5 - 36.5 g/dL Final    RDW 12/22/2023 12.0  10.0 - 15.0 % Final    Platelet Count 12/22/2023 286  150 - 450 10e3/uL Final    % Neutrophils 12/22/2023 79  % Final    % Lymphocytes 12/22/2023 15  % Final    % Monocytes 12/22/2023 4  % Final    % Eosinophils 12/22/2023 1  % Final    % Basophils 12/22/2023 0  % Final    % Immature Granulocytes 12/22/2023 1  % Final    NRBCs per 100 WBC 12/22/2023 0  <1 /100 Final    Absolute Neutrophils 12/22/2023 7.5  1.6 - 8.3 10e3/uL Final    Absolute Lymphocytes 12/22/2023 1.5  0.8 - 5.3 10e3/uL Final    Absolute Monocytes 12/22/2023 0.3  0.0 - 1.3 10e3/uL Final    Absolute Eosinophils 12/22/2023 0.1  0.0 - 0.7 10e3/uL Final    Absolute Basophils 12/22/2023 0.0  0.0 - 0.2 10e3/uL Final    Absolute Immature Granulocytes 12/22/2023 0.1  <=0.4 10e3/uL Final    Absolute NRBCs 12/22/2023 0.0  10e3/uL Final
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
## 2024-01-29 ENCOUNTER — MYC MEDICAL ADVICE (OUTPATIENT)
Dept: FAMILY MEDICINE | Facility: CLINIC | Age: 49
End: 2024-01-29
Payer: COMMERCIAL

## 2024-01-29 DIAGNOSIS — F41.9 ANXIETY: ICD-10-CM

## 2024-01-29 RX ORDER — BUSPIRONE HYDROCHLORIDE 7.5 MG/1
7.5 TABLET ORAL 2 TIMES DAILY
Qty: 60 TABLET | Refills: 0 | Status: SHIPPED | OUTPATIENT
Start: 2024-01-29 | End: 2024-02-09

## 2024-01-29 NOTE — TELEPHONE ENCOUNTER
She is establishing care with Paola Card in February. DO you want to increase this for the time being? Not sure how to go about this?    Dalia Samuel,MARLIN, RN

## 2024-01-29 NOTE — TELEPHONE ENCOUNTER
Ok to increase to 7.5mg twice daily. Order sent to [facility] in [place].

## 2024-02-09 ENCOUNTER — OFFICE VISIT (OUTPATIENT)
Dept: FAMILY MEDICINE | Facility: CLINIC | Age: 49
End: 2024-02-09
Payer: COMMERCIAL

## 2024-02-09 VITALS
HEIGHT: 61 IN | BODY MASS INDEX: 21.52 KG/M2 | WEIGHT: 114 LBS | OXYGEN SATURATION: 100 % | SYSTOLIC BLOOD PRESSURE: 108 MMHG | TEMPERATURE: 97 F | DIASTOLIC BLOOD PRESSURE: 70 MMHG | HEART RATE: 72 BPM

## 2024-02-09 DIAGNOSIS — F41.9 ANXIETY: Primary | ICD-10-CM

## 2024-02-09 PROBLEM — T85.49XA BREAST IMPLANT DEFLATION: Status: RESOLVED | Noted: 2023-05-19 | Resolved: 2024-02-09

## 2024-02-09 PROBLEM — S60.221A CONTUSION OF RIGHT HAND, INITIAL ENCOUNTER: Status: RESOLVED | Noted: 2023-11-10 | Resolved: 2024-02-09

## 2024-02-09 PROBLEM — F41.8 SITUATIONAL ANXIETY: Status: RESOLVED | Noted: 2019-03-20 | Resolved: 2024-02-09

## 2024-02-09 LAB
BASOPHILS # BLD AUTO: 0.1 10E3/UL (ref 0–0.2)
BASOPHILS NFR BLD AUTO: 1 %
EOSINOPHIL # BLD AUTO: 0.3 10E3/UL (ref 0–0.7)
EOSINOPHIL NFR BLD AUTO: 4 %
ERYTHROCYTE [DISTWIDTH] IN BLOOD BY AUTOMATED COUNT: 11.9 % (ref 10–15)
FERRITIN SERPL-MCNC: 40 NG/ML (ref 6–175)
HCT VFR BLD AUTO: 40.6 % (ref 35–47)
HGB BLD-MCNC: 13.6 G/DL (ref 11.7–15.7)
IMM GRANULOCYTES # BLD: 0 10E3/UL
IMM GRANULOCYTES NFR BLD: 0 %
LYMPHOCYTES # BLD AUTO: 2 10E3/UL (ref 0.8–5.3)
LYMPHOCYTES NFR BLD AUTO: 26 %
MCH RBC QN AUTO: 31.1 PG (ref 26.5–33)
MCHC RBC AUTO-ENTMCNC: 33.5 G/DL (ref 31.5–36.5)
MCV RBC AUTO: 93 FL (ref 78–100)
MONOCYTES # BLD AUTO: 0.4 10E3/UL (ref 0–1.3)
MONOCYTES NFR BLD AUTO: 6 %
NEUTROPHILS # BLD AUTO: 4.7 10E3/UL (ref 1.6–8.3)
NEUTROPHILS NFR BLD AUTO: 63 %
NRBC # BLD AUTO: 0 10E3/UL
NRBC BLD AUTO-RTO: 0 /100
PLATELET # BLD AUTO: 287 10E3/UL (ref 150–450)
RBC # BLD AUTO: 4.38 10E6/UL (ref 3.8–5.2)
TSH SERPL DL<=0.005 MIU/L-ACNC: 2.22 UIU/ML (ref 0.3–4.2)
WBC # BLD AUTO: 7.5 10E3/UL (ref 4–11)

## 2024-02-09 PROCEDURE — 84443 ASSAY THYROID STIM HORMONE: CPT | Performed by: NURSE PRACTITIONER

## 2024-02-09 PROCEDURE — 82728 ASSAY OF FERRITIN: CPT | Performed by: NURSE PRACTITIONER

## 2024-02-09 PROCEDURE — 99214 OFFICE O/P EST MOD 30 MIN: CPT | Performed by: NURSE PRACTITIONER

## 2024-02-09 PROCEDURE — 36415 COLL VENOUS BLD VENIPUNCTURE: CPT | Performed by: NURSE PRACTITIONER

## 2024-02-09 PROCEDURE — 85025 COMPLETE CBC W/AUTO DIFF WBC: CPT | Performed by: NURSE PRACTITIONER

## 2024-02-09 RX ORDER — BUSPIRONE HYDROCHLORIDE 5 MG/1
7.5 TABLET ORAL 3 TIMES DAILY
Qty: 90 TABLET | Refills: 3 | Status: SHIPPED | OUTPATIENT
Start: 2024-02-09 | End: 2024-02-09

## 2024-02-09 RX ORDER — BUSPIRONE HYDROCHLORIDE 5 MG/1
5 TABLET ORAL 3 TIMES DAILY
Qty: 90 TABLET | Refills: 3 | Status: SHIPPED | OUTPATIENT
Start: 2024-02-09 | End: 2024-08-01

## 2024-02-09 ASSESSMENT — PAIN SCALES - GENERAL: PAINLEVEL: NO PAIN (0)
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
## 2024-03-16 ENCOUNTER — HEALTH MAINTENANCE LETTER (OUTPATIENT)
Age: 49
End: 2024-03-16

## 2024-03-20 ENCOUNTER — MYC MEDICAL ADVICE (OUTPATIENT)
Dept: FAMILY MEDICINE | Facility: CLINIC | Age: 49
End: 2024-03-20

## 2024-08-01 ENCOUNTER — MYC REFILL (OUTPATIENT)
Dept: FAMILY MEDICINE | Facility: CLINIC | Age: 49
End: 2024-08-01
Payer: COMMERCIAL

## 2024-08-01 DIAGNOSIS — F41.9 ANXIETY: ICD-10-CM

## 2024-08-01 RX ORDER — BUSPIRONE HYDROCHLORIDE 5 MG/1
5 TABLET ORAL 3 TIMES DAILY
Qty: 90 TABLET | Refills: 3 | OUTPATIENT
Start: 2024-08-01

## 2024-08-01 RX ORDER — BUSPIRONE HYDROCHLORIDE 5 MG/1
5 TABLET ORAL 3 TIMES DAILY
Qty: 90 TABLET | Refills: 3 | Status: SHIPPED | OUTPATIENT
Start: 2024-08-01

## 2024-09-19 ENCOUNTER — TELEPHONE (OUTPATIENT)
Dept: FAMILY MEDICINE | Facility: CLINIC | Age: 49
End: 2024-09-19
Payer: COMMERCIAL

## 2024-09-19 NOTE — LETTER
To  Mecca Engle  [address] [address] [place] [id number]                      Your team at [facility] cares about your health. We have reviewed your chart and based on our findings; we are making the following recommendations to better manage your health.     You are in particular need of attention regarding the following:     Schedule Annual MAMMOGRAPHY. [facility] scheduling number is [phone] or schedule in [facility] (self referral).  Schedule a primary care office visit with your provider for a Pap Smear to screen for Cervical Cancer.  PREVENTATIVE VISIT: Physical  OTHER FOLLOW UP: Colorectal Cancer Screening     If you have already completed these items, please contact the clinic via phone or   [facility] so your care team can review and update your records. Thank you for   choosing [facility] for your healthcare needs. For any questions,   concerns, or to schedule an appointment please contact our clinic.    Healthy Regards,      Your [facility] Care Team

## 2024-09-19 NOTE — TELEPHONE ENCOUNTER
Patient Quality Outreach    Patient is due for the following:   Colon Cancer Screening  Breast Cancer Screening - Mammogram  Cervical Cancer Screening - PAP Needed  Physical Preventive Adult Physical    Next Steps:   Schedule a Adult Preventative    Type of outreach:    Sent letter.    Next Steps:  Reach out within 90 days via Phone.    Max number of attempts reached: No. Will try again in 90 days if patient still on fail list.    Questions for provider review:    None           Liz Olivo, VF

## 2025-01-07 ENCOUNTER — MYC REFILL (OUTPATIENT)
Dept: FAMILY MEDICINE | Facility: CLINIC | Age: 50
End: 2025-01-07
Payer: COMMERCIAL

## 2025-01-07 DIAGNOSIS — F41.9 ANXIETY: ICD-10-CM

## 2025-01-07 RX ORDER — BUSPIRONE HYDROCHLORIDE 5 MG/1
5 TABLET ORAL 3 TIMES DAILY
Qty: 90 TABLET | Refills: 3 | OUTPATIENT
Start: 2025-01-07

## 2025-02-10 DIAGNOSIS — F41.9 ANXIETY: ICD-10-CM

## 2025-02-11 RX ORDER — BUSPIRONE HYDROCHLORIDE 5 MG/1
5 TABLET ORAL 3 TIMES DAILY
Qty: 90 TABLET | Refills: 0 | Status: SHIPPED | OUTPATIENT
Start: 2025-02-11

## 2025-02-12 ENCOUNTER — MYC REFILL (OUTPATIENT)
Dept: FAMILY MEDICINE | Facility: CLINIC | Age: 50
End: 2025-02-12
Payer: COMMERCIAL

## 2025-02-12 DIAGNOSIS — F41.9 ANXIETY: ICD-10-CM

## 2025-02-12 RX ORDER — BUSPIRONE HYDROCHLORIDE 5 MG/1
5 TABLET ORAL 3 TIMES DAILY
Qty: 90 TABLET | Refills: 0 | OUTPATIENT
Start: 2025-02-12

## 2025-03-22 ENCOUNTER — HEALTH MAINTENANCE LETTER (OUTPATIENT)
Age: 50
End: 2025-03-22

## 2025-03-26 DIAGNOSIS — F41.9 ANXIETY: ICD-10-CM

## 2025-03-26 RX ORDER — BUSPIRONE HYDROCHLORIDE 5 MG/1
5 TABLET ORAL 3 TIMES DAILY
Qty: 90 TABLET | Refills: 0 | Status: SHIPPED | OUTPATIENT
Start: 2025-03-26

## 2025-03-31 ENCOUNTER — MYC REFILL (OUTPATIENT)
Dept: FAMILY MEDICINE | Facility: CLINIC | Age: 50
End: 2025-03-31
Payer: COMMERCIAL

## 2025-03-31 DIAGNOSIS — F41.9 ANXIETY: ICD-10-CM

## 2025-03-31 RX ORDER — BUSPIRONE HYDROCHLORIDE 5 MG/1
5 TABLET ORAL 3 TIMES DAILY
Qty: 90 TABLET | Refills: 0 | OUTPATIENT
Start: 2025-03-31

## 2025-05-24 ENCOUNTER — HEALTH MAINTENANCE LETTER (OUTPATIENT)
Age: 50
End: 2025-05-24

## 2025-07-02 DIAGNOSIS — F41.9 ANXIETY: ICD-10-CM

## 2025-07-03 RX ORDER — BUSPIRONE HYDROCHLORIDE 5 MG/1
5 TABLET ORAL 3 TIMES DAILY
Qty: 90 TABLET | Refills: 0 | Status: SHIPPED | OUTPATIENT
Start: 2025-07-03

## 2025-08-23 DIAGNOSIS — F41.9 ANXIETY: ICD-10-CM

## 2025-08-25 RX ORDER — BUSPIRONE HYDROCHLORIDE 5 MG/1
5 TABLET ORAL 3 TIMES DAILY
Qty: 90 TABLET | Refills: 0 | Status: SHIPPED | OUTPATIENT
Start: 2025-08-25